# Patient Record
Sex: FEMALE | Race: WHITE | NOT HISPANIC OR LATINO | Employment: OTHER | URBAN - METROPOLITAN AREA
[De-identification: names, ages, dates, MRNs, and addresses within clinical notes are randomized per-mention and may not be internally consistent; named-entity substitution may affect disease eponyms.]

---

## 2017-04-14 ENCOUNTER — TELEPHONE (OUTPATIENT)
Dept: CARDIOLOGY | Facility: CLINIC | Age: 67
End: 2017-04-14

## 2017-04-14 NOTE — TELEPHONE ENCOUNTER
Pt left v/m inquiring about antibiotics.  Pt has not been seen in 3yrs and is considered a new pt.  I called but had to leave a v/m, informed her to contact scheduling to make a new pt appt.

## 2017-06-13 ENCOUNTER — HOSPITAL ENCOUNTER (OUTPATIENT)
Dept: CARDIOLOGY | Facility: HOSPITAL | Age: 67
Discharge: HOME | End: 2017-06-13
Admitting: INTERNAL MEDICINE

## 2017-06-13 ENCOUNTER — TRANSCRIBE ORDERS (OUTPATIENT)
Dept: ADMINISTRATIVE | Facility: HOSPITAL | Age: 67
End: 2017-06-13

## 2017-06-13 DIAGNOSIS — M79.89 PAIN AND SWELLING OF LEFT LOWER LEG: Primary | ICD-10-CM

## 2017-06-13 DIAGNOSIS — M79.89 PAIN AND SWELLING OF LEFT LOWER LEG: ICD-10-CM

## 2017-06-13 DIAGNOSIS — M79.662 PAIN AND SWELLING OF LEFT LOWER LEG: Primary | ICD-10-CM

## 2017-06-13 DIAGNOSIS — M79.662 PAIN AND SWELLING OF LEFT LOWER LEG: ICD-10-CM

## 2017-06-13 LAB
BH CV LOWER VASCULAR LEFT COMMON FEMORAL AUGMENT: NORMAL
BH CV LOWER VASCULAR LEFT COMMON FEMORAL COMPETENT: NORMAL
BH CV LOWER VASCULAR LEFT COMMON FEMORAL COMPRESS: NORMAL
BH CV LOWER VASCULAR LEFT COMMON FEMORAL PHASIC: NORMAL
BH CV LOWER VASCULAR LEFT COMMON FEMORAL SPONT: NORMAL
BH CV LOWER VASCULAR LEFT DISTAL FEMORAL COMPRESS: NORMAL
BH CV LOWER VASCULAR LEFT GASTRONEMIUS COMPRESS: NORMAL
BH CV LOWER VASCULAR LEFT GREATER SAPH AK COMPRESS: NORMAL
BH CV LOWER VASCULAR LEFT GREATER SAPH BK COMPRESS: NORMAL
BH CV LOWER VASCULAR LEFT MID FEMORAL AUGMENT: NORMAL
BH CV LOWER VASCULAR LEFT MID FEMORAL COMPETENT: NORMAL
BH CV LOWER VASCULAR LEFT MID FEMORAL COMPRESS: NORMAL
BH CV LOWER VASCULAR LEFT MID FEMORAL PHASIC: NORMAL
BH CV LOWER VASCULAR LEFT MID FEMORAL SPONT: NORMAL
BH CV LOWER VASCULAR LEFT PERONEAL COMPRESS: NORMAL
BH CV LOWER VASCULAR LEFT POPLITEAL AUGMENT: NORMAL
BH CV LOWER VASCULAR LEFT POPLITEAL COMPETENT: NORMAL
BH CV LOWER VASCULAR LEFT POPLITEAL COMPRESS: NORMAL
BH CV LOWER VASCULAR LEFT POPLITEAL PHASIC: NORMAL
BH CV LOWER VASCULAR LEFT POPLITEAL SPONT: NORMAL
BH CV LOWER VASCULAR LEFT POSTERIOR TIBIAL COMPRESS: NORMAL
BH CV LOWER VASCULAR LEFT PROXIMAL FEMORAL COMPRESS: NORMAL
BH CV LOWER VASCULAR LEFT SAPHENOFEMORAL JUNCTION AUGMENT: NORMAL
BH CV LOWER VASCULAR LEFT SAPHENOFEMORAL JUNCTION COMPETENT: NORMAL
BH CV LOWER VASCULAR LEFT SAPHENOFEMORAL JUNCTION COMPRESS: NORMAL
BH CV LOWER VASCULAR LEFT SAPHENOFEMORAL JUNCTION PHASIC: NORMAL
BH CV LOWER VASCULAR LEFT SAPHENOFEMORAL JUNCTION SPONT: NORMAL
BH CV LOWER VASCULAR RIGHT COMMON FEMORAL AUGMENT: NORMAL
BH CV LOWER VASCULAR RIGHT COMMON FEMORAL COMPETENT: NORMAL
BH CV LOWER VASCULAR RIGHT COMMON FEMORAL COMPRESS: NORMAL
BH CV LOWER VASCULAR RIGHT COMMON FEMORAL PHASIC: NORMAL
BH CV LOWER VASCULAR RIGHT COMMON FEMORAL SPONT: NORMAL

## 2017-06-13 PROCEDURE — 93971 EXTREMITY STUDY: CPT

## 2018-06-10 ENCOUNTER — PREP FOR SURGERY (OUTPATIENT)
Dept: OTHER | Facility: HOSPITAL | Age: 68
End: 2018-06-10

## 2018-06-10 DIAGNOSIS — K63.5 COLON POLYP: Primary | ICD-10-CM

## 2018-07-06 ENCOUNTER — OFFICE VISIT (OUTPATIENT)
Dept: GASTROENTEROLOGY | Facility: CLINIC | Age: 68
End: 2018-07-06

## 2018-07-06 VITALS
BODY MASS INDEX: 19.36 KG/M2 | SYSTOLIC BLOOD PRESSURE: 130 MMHG | DIASTOLIC BLOOD PRESSURE: 78 MMHG | TEMPERATURE: 97.8 F | WEIGHT: 113.4 LBS | HEIGHT: 64 IN

## 2018-07-06 DIAGNOSIS — R14.0 ABDOMINAL BLOATING: ICD-10-CM

## 2018-07-06 DIAGNOSIS — R11.0 NAUSEA: ICD-10-CM

## 2018-07-06 DIAGNOSIS — R68.81 EARLY SATIETY: ICD-10-CM

## 2018-07-06 DIAGNOSIS — R10.12 LEFT UPPER QUADRANT PAIN: Primary | ICD-10-CM

## 2018-07-06 DIAGNOSIS — Z80.0 FAMILY HISTORY OF PANCREATIC CANCER: ICD-10-CM

## 2018-07-06 DIAGNOSIS — D12.6 ADENOMATOUS POLYP OF COLON, UNSPECIFIED PART OF COLON: ICD-10-CM

## 2018-07-06 LAB
ALBUMIN SERPL-MCNC: 5.3 G/DL (ref 3.5–5.2)
ALBUMIN/GLOB SERPL: 2 G/DL
ALP SERPL-CCNC: 69 U/L (ref 39–117)
ALT SERPL-CCNC: 13 U/L (ref 1–33)
AMYLASE SERPL-CCNC: 78 U/L (ref 28–100)
AST SERPL-CCNC: 16 U/L (ref 1–32)
BASOPHILS # BLD AUTO: 0.03 10*3/MM3 (ref 0–0.2)
BASOPHILS NFR BLD AUTO: 0.6 % (ref 0–1.5)
BILIRUB SERPL-MCNC: 0.3 MG/DL (ref 0.1–1.2)
BUN SERPL-MCNC: 9 MG/DL (ref 8–23)
BUN/CREAT SERPL: 12.5 (ref 7–25)
CALCIUM SERPL-MCNC: 9.7 MG/DL (ref 8.6–10.5)
CHLORIDE SERPL-SCNC: 100 MMOL/L (ref 98–107)
CO2 SERPL-SCNC: 29.7 MMOL/L (ref 22–29)
CREAT SERPL-MCNC: 0.72 MG/DL (ref 0.57–1)
EOSINOPHIL # BLD AUTO: 0.06 10*3/MM3 (ref 0–0.7)
EOSINOPHIL NFR BLD AUTO: 1.2 % (ref 0.3–6.2)
ERYTHROCYTE [DISTWIDTH] IN BLOOD BY AUTOMATED COUNT: 12.5 % (ref 11.7–13)
GLOBULIN SER CALC-MCNC: 2.6 GM/DL
GLUCOSE SERPL-MCNC: 79 MG/DL (ref 65–99)
HCT VFR BLD AUTO: 36.8 % (ref 35.6–45.5)
HGB BLD-MCNC: 12.1 G/DL (ref 11.9–15.5)
IMM GRANULOCYTES # BLD: 0 10*3/MM3 (ref 0–0.03)
IMM GRANULOCYTES NFR BLD: 0 % (ref 0–0.5)
LIPASE SERPL-CCNC: 30 U/L (ref 13–60)
LYMPHOCYTES # BLD AUTO: 1.7 10*3/MM3 (ref 0.9–4.8)
LYMPHOCYTES NFR BLD AUTO: 34.4 % (ref 19.6–45.3)
MCH RBC QN AUTO: 30 PG (ref 26.9–32)
MCHC RBC AUTO-ENTMCNC: 32.9 G/DL (ref 32.4–36.3)
MCV RBC AUTO: 91.1 FL (ref 80.5–98.2)
MONOCYTES # BLD AUTO: 0.34 10*3/MM3 (ref 0.2–1.2)
MONOCYTES NFR BLD AUTO: 6.9 % (ref 5–12)
NEUTROPHILS # BLD AUTO: 2.81 10*3/MM3 (ref 1.9–8.1)
NEUTROPHILS NFR BLD AUTO: 56.9 % (ref 42.7–76)
PLATELET # BLD AUTO: 283 10*3/MM3 (ref 140–500)
POTASSIUM SERPL-SCNC: 4.3 MMOL/L (ref 3.5–5.2)
PROT SERPL-MCNC: 7.9 G/DL (ref 6–8.5)
RBC # BLD AUTO: 4.04 10*6/MM3 (ref 3.9–5.2)
SODIUM SERPL-SCNC: 140 MMOL/L (ref 136–145)
WBC # BLD AUTO: 4.94 10*3/MM3 (ref 4.5–10.7)

## 2018-07-06 PROCEDURE — 99214 OFFICE O/P EST MOD 30 MIN: CPT | Performed by: NURSE PRACTITIONER

## 2018-07-06 NOTE — PROGRESS NOTES
Chief Complaint   Patient presents with   • Abdominal Pain     upper left        Lillian Harding is a  68 y.o. female here for a follow up visit for abd pain, nausea and bloating.     HPI  69 yo f presents today for follow up visit for LUQ abd pain, nausea and abd bloating. She is a patient of Dr. Pedraza. She was last seen in the office on 12/2015. At that time she was having RUQ abd pain and had negative gallbladder workup. Since then the RUQ abd resolved. She did have colonoscopy done on 6/6/2016 that showed a 4 mm colon polyp. Path + TA with low grade dysplasia. Dr. Pedraza had wanted her to repeat another colonoscopy with better prep in a year but patient was lost to follow up. Earlier this year she started having early satiety, occasional nausea, abd bloating and LUQ abd pain. She went to her PCP who referred her back here. Patient admits sometimes the pain is worse with movement or a lot of yard work. It can be sharp and stabbing or dull and achey. It can sometimes feel like it wraps around to the upper back. She admits her bowels are slow and she normally has BM every other day but this has been her pattern for years. She denies any dysphagia, reflux, vomiting, diarrhea, rectal bleeding or melena. She doesn't see any correlation between the abd pain and eating or having a BM. Pain does seem worse later in the day. She admits her appetite is good and her weight is stable. She is worried because both parents had pancreatitic cancer. She denies previous pancreatitis or biliary issues.   Past Medical History:   Diagnosis Date   • Acoustic neuroma (CMS/HCC)    • Colon polyp    • Hyperlipidemia    • Hypertension    • Meniere disease    • Raynaud's phenomenon    • Rheumatic fever        Past Surgical History:   Procedure Laterality Date   • COLONOSCOPY N/A 6/6/2016    4mm colon polyp, otherwise normal exam   • ENDOMETRIAL ABLATION     • EYE SURGERY Left     vitreous detachment   • HYSTERECTOMY         Scheduled  Meds:    Continuous Infusions:  No current facility-administered medications for this visit.     PRN Meds:.    Allergies   Allergen Reactions   • Mobic [Meloxicam] Swelling       Social History     Social History   • Marital status:      Spouse name: N/A   • Number of children: N/A   • Years of education: N/A     Occupational History   • Not on file.     Social History Main Topics   • Smoking status: Never Smoker   • Smokeless tobacco: Not on file   • Alcohol use No   • Drug use: No   • Sexual activity: Not Currently     Partners: Male     Birth control/ protection: Post-menopausal     Other Topics Concern   • Not on file     Social History Narrative   • No narrative on file       Family History   Problem Relation Age of Onset   • Heart disease Father    • Heart disease Brother    • Stroke Maternal Uncle    • Stroke Maternal Grandfather    • Heart disease Paternal Grandmother        Review of Systems   Constitutional: Negative for activity change, appetite change, chills, diaphoresis, fatigue, fever and unexpected weight change.   HENT: Negative for nosebleeds, postnasal drip, sore throat, trouble swallowing and voice change.    Respiratory: Negative for cough, choking, chest tightness, shortness of breath and wheezing.    Cardiovascular: Negative for chest pain.   Gastrointestinal: Positive for abdominal distention, abdominal pain and nausea. Negative for anal bleeding, blood in stool, constipation, diarrhea, rectal pain and vomiting.   Endocrine: Negative for polydipsia, polyphagia and polyuria.   Musculoskeletal: Negative for gait problem.   Skin: Negative for rash and wound.   Allergic/Immunologic: Negative for food allergies.   Neurological: Negative for dizziness, speech difficulty and light-headedness.   Psychiatric/Behavioral: Negative for confusion, self-injury, sleep disturbance and suicidal ideas.       Vitals:    07/06/18 0919   BP: 130/78   Temp: 97.8 °F (36.6 °C)       Physical Exam    Constitutional: She is oriented to person, place, and time. She appears well-developed and well-nourished. She does not appear ill. No distress.   HENT:   Head: Normocephalic.   Eyes: Pupils are equal, round, and reactive to light.   Cardiovascular: Normal rate, regular rhythm and normal heart sounds.    Pulmonary/Chest: Effort normal and breath sounds normal.   Abdominal: Soft. Bowel sounds are normal. She exhibits no distension and no mass. There is no hepatosplenomegaly. There is no tenderness. There is no rebound and no guarding. No hernia.   Musculoskeletal: Normal range of motion.   Neurological: She is alert and oriented to person, place, and time.   Skin: Skin is warm and dry.   Psychiatric: She has a normal mood and affect. Her speech is normal and behavior is normal. Judgment normal.       No images are attached to the encounter.    Assessment & Plan     1. Left upper quadrant pain  - CBC & Differential  - Comprehensive Metabolic Panel  - Amylase  - Lipase  - Case Request; Standing  - Case Request    2. Early satiety  - CBC & Differential  - Comprehensive Metabolic Panel  - Case Request; Standing  - Case Request    3. Nausea  - Amylase  - Lipase  - Case Request; Standing  - Case Request    4. Abdominal bloating  - Amylase  - Lipase  - Case Request; Standing  - Case Request    5. Adenomatous polyp of colon, unspecified part of colon  - Case Request; Standing  - Case Request    6. Family history of pancreatic cancer  - Amylase  - Lipase      Given hx and current symptoms will check labs and CT scan abd/pelvis. Recommend EGD and colonoscopy with Dr. Pedraza for further evaluation. Discussed potential risks of the procedures and patient is agreeable to proceed. Call office with any issues. Follow up with Dr. Pedraza after scopes.

## 2018-07-11 ENCOUNTER — HOSPITAL ENCOUNTER (OUTPATIENT)
Dept: CT IMAGING | Facility: HOSPITAL | Age: 68
Discharge: HOME | End: 2018-07-11
Admitting: NURSE PRACTITIONER

## 2018-07-11 DIAGNOSIS — Z80.0 FAMILY HISTORY OF PANCREATIC CANCER: ICD-10-CM

## 2018-07-11 DIAGNOSIS — R68.81 EARLY SATIETY: ICD-10-CM

## 2018-07-11 DIAGNOSIS — R10.12 LEFT UPPER QUADRANT PAIN: ICD-10-CM

## 2018-07-11 DIAGNOSIS — R14.0 ABDOMINAL BLOATING: ICD-10-CM

## 2018-07-11 DIAGNOSIS — R11.0 NAUSEA: ICD-10-CM

## 2018-07-11 LAB — CREAT BLDA-MCNC: 0.6 MG/DL (ref 0.6–1.3)

## 2018-07-11 PROCEDURE — 74177 CT ABD & PELVIS W/CONTRAST: CPT

## 2018-07-11 PROCEDURE — 25010000002 IOPAMIDOL 61 % SOLUTION: Performed by: NURSE PRACTITIONER

## 2018-07-11 PROCEDURE — 82565 ASSAY OF CREATININE: CPT

## 2018-07-11 RX ADMIN — IOPAMIDOL 85 ML: 612 INJECTION, SOLUTION INTRAVENOUS at 14:53

## 2018-07-13 ENCOUNTER — TELEPHONE (OUTPATIENT)
Dept: GASTROENTEROLOGY | Facility: CLINIC | Age: 68
End: 2018-07-13

## 2018-07-13 NOTE — TELEPHONE ENCOUNTER
----- Message from LACIE Perry sent at 7/6/2018  4:30 PM EDT -----  Please call patient and let her know all her labs looked good including her pancreas labs. Thanks.

## 2018-07-13 NOTE — TELEPHONE ENCOUNTER
Call to pt.  Advise per KATY Sifuentes that all labs looked good, including pancrease labs.  Pt verb understanding.

## 2018-07-26 ENCOUNTER — TELEPHONE (OUTPATIENT)
Dept: GASTROENTEROLOGY | Facility: CLINIC | Age: 68
End: 2018-07-26

## 2018-07-26 NOTE — TELEPHONE ENCOUNTER
----- Message from Seamus Vasquez sent at 7/26/2018  1:38 PM EDT -----  Regarding: PT CALLED FOR HER CT SCAN RESULTS   Contact: [phone]  ...

## 2018-07-26 NOTE — TELEPHONE ENCOUNTER
----- Message from LACIE Perry sent at 7/16/2018  8:32 AM EDT -----  Please call patient and let her know the CT scan showed:      Impression    1. No acute abnormality within the abdomen and pelvis.  2. Bilateral renal cortical cysts, the largest cyst within the left  kidney demonstrates thin internal septation. Follow-up recommended.  3. Moderate to large amount of stool throughout the colon suggestive of  constipation.    Is she aware of these renal cysts? I would recommend she have her PCP follow up with her about them. Looks like she has issues with constipation. Otherwise no other acute abdominal process noted. I would recommend she start some stool softeners or eat some prunes to help her get more regular. She can also try miralax if she hasnt already. Thanks.

## 2018-07-26 NOTE — TELEPHONE ENCOUNTER
Call to pt.  [facility] has seen CT results in MyChart.    Advise per M Maria that checking if aware of renal cysts.  Would recommend f/u with PCP about them.  Looks like issues with constipation.  Otherwise, no acute abd process noted.  Recommend start some stool softeners or eat some prunes to help get more regular.  Can also try miralax if hasn't already.    Pt verb understanding.  [facility] has made appt with Dr Santiago Kc to f/u on renal cysts.    CT faxed via [facility] to DR Santiago Kc.

## 2018-08-17 ENCOUNTER — ANESTHESIA (OUTPATIENT)
Dept: GASTROENTEROLOGY | Facility: HOSPITAL | Age: 68
End: 2018-08-17

## 2018-08-17 ENCOUNTER — HOSPITAL ENCOUNTER (OUTPATIENT)
Facility: HOSPITAL | Age: 68
Setting detail: HOSPITAL OUTPATIENT SURGERY
Discharge: HOME | End: 2018-08-17
Attending: INTERNAL MEDICINE | Admitting: INTERNAL MEDICINE

## 2018-08-17 ENCOUNTER — ANESTHESIA EVENT (OUTPATIENT)
Dept: GASTROENTEROLOGY | Facility: HOSPITAL | Age: 68
End: 2018-08-17

## 2018-08-17 VITALS
HEIGHT: 64 IN | OXYGEN SATURATION: 100 % | RESPIRATION RATE: 12 BRPM | HEART RATE: 64 BPM | BODY MASS INDEX: 18.71 KG/M2 | WEIGHT: 109.6 LBS | DIASTOLIC BLOOD PRESSURE: 69 MMHG | TEMPERATURE: 98.1 F | SYSTOLIC BLOOD PRESSURE: 117 MMHG

## 2018-08-17 DIAGNOSIS — R14.0 ABDOMINAL BLOATING: ICD-10-CM

## 2018-08-17 DIAGNOSIS — R10.12 LEFT UPPER QUADRANT PAIN: ICD-10-CM

## 2018-08-17 DIAGNOSIS — R11.0 NAUSEA: ICD-10-CM

## 2018-08-17 DIAGNOSIS — R68.81 EARLY SATIETY: ICD-10-CM

## 2018-08-17 DIAGNOSIS — D12.6 ADENOMATOUS POLYP OF COLON, UNSPECIFIED PART OF COLON: ICD-10-CM

## 2018-08-17 PROCEDURE — 88312 SPECIAL STAINS GROUP 1: CPT | Performed by: INTERNAL MEDICINE

## 2018-08-17 PROCEDURE — 43239 EGD BIOPSY SINGLE/MULTIPLE: CPT | Performed by: INTERNAL MEDICINE

## 2018-08-17 PROCEDURE — 88305 TISSUE EXAM BY PATHOLOGIST: CPT | Performed by: INTERNAL MEDICINE

## 2018-08-17 PROCEDURE — 25010000002 PROPOFOL 10 MG/ML EMULSION: Performed by: ANESTHESIOLOGY

## 2018-08-17 PROCEDURE — 45380 COLONOSCOPY AND BIOPSY: CPT | Performed by: INTERNAL MEDICINE

## 2018-08-17 RX ORDER — PROPOFOL 10 MG/ML
VIAL (ML) INTRAVENOUS AS NEEDED
Status: DISCONTINUED | OUTPATIENT
Start: 2018-08-17 | End: 2018-08-17 | Stop reason: SURG

## 2018-08-17 RX ORDER — SODIUM CHLORIDE, SODIUM LACTATE, POTASSIUM CHLORIDE, CALCIUM CHLORIDE 600; 310; 30; 20 MG/100ML; MG/100ML; MG/100ML; MG/100ML
1000 INJECTION, SOLUTION INTRAVENOUS CONTINUOUS
Status: DISCONTINUED | OUTPATIENT
Start: 2018-08-17 | End: 2018-08-17 | Stop reason: HOSPADM

## 2018-08-17 RX ORDER — LIDOCAINE HYDROCHLORIDE 20 MG/ML
INJECTION, SOLUTION INFILTRATION; PERINEURAL AS NEEDED
Status: DISCONTINUED | OUTPATIENT
Start: 2018-08-17 | End: 2018-08-17 | Stop reason: SURG

## 2018-08-17 RX ORDER — UBIDECARENONE 100 MG
200 CAPSULE ORAL DAILY
COMMUNITY

## 2018-08-17 RX ORDER — ESTRADIOL 0.1 MG/G
2 CREAM VAGINAL DAILY
COMMUNITY

## 2018-08-17 RX ADMIN — SODIUM CHLORIDE, POTASSIUM CHLORIDE, SODIUM LACTATE AND CALCIUM CHLORIDE 1000 ML: 600; 310; 30; 20 INJECTION, SOLUTION INTRAVENOUS at 08:49

## 2018-08-17 RX ADMIN — PROPOFOL 200 MG: 10 INJECTION, EMULSION INTRAVENOUS at 09:01

## 2018-08-17 RX ADMIN — PROPOFOL 200 MG: 10 INJECTION, EMULSION INTRAVENOUS at 09:25

## 2018-08-17 RX ADMIN — LIDOCAINE HYDROCHLORIDE 100 MG: 20 INJECTION, SOLUTION INFILTRATION; PERINEURAL at 09:01

## 2018-08-17 NOTE — H&P
Chief Complaint   Patient presents with   • Abdominal Pain       upper left          Lillian Harding is a  68 y.o. female here for a follow up visit for abd pain, nausea and bloating.      HPI  67 yo f presents today for follow up visit for LUQ abd pain, nausea and abd bloating. She is a patient of Dr. Pedraza. She was last seen in the office on 12/2015. At that time she was having RUQ abd pain and had negative gallbladder workup. Since then the RUQ abd resolved. She did have colonoscopy done on 6/6/2016 that showed a 4 mm colon polyp. Path + TA with low grade dysplasia. Dr. Pedraza had wanted her to repeat another colonoscopy with better prep in a year but patient was lost to follow up. Earlier this year she started having early satiety, occasional nausea, abd bloating and LUQ abd pain. She went to her PCP who referred her back here. Patient admits sometimes the pain is worse with movement or a lot of yard work. It can be sharp and stabbing or dull and achey. It can sometimes feel like it wraps around to the upper back. She admits her bowels are slow and she normally has BM every other day but this has been her pattern for years. She denies any dysphagia, reflux, vomiting, diarrhea, rectal bleeding or melena. She doesn't see any correlation between the abd pain and eating or having a BM. Pain does seem worse later in the day. She admits her appetite is good and her weight is stable. She is worried because both parents had pancreatitic cancer. She denies previous pancreatitis or biliary issues.   Medical History        Past Medical History:   Diagnosis Date   • Acoustic neuroma (CMS/HCC)     • Colon polyp     • Hyperlipidemia     • Hypertension     • Meniere disease     • Raynaud's phenomenon     • Rheumatic fever              Surgical History         Past Surgical History:   Procedure Laterality Date   • COLONOSCOPY N/A 6/6/2016     4mm colon polyp, otherwise normal exam   • ENDOMETRIAL ABLATION       • EYE SURGERY  Left       vitreous detachment   • HYSTERECTOMY                Scheduled Meds:     Continuous Infusions:  No current facility-administered medications for this visit.      PRN Meds:.     Allergies   Allergen Reactions   • Mobic [Meloxicam] Swelling         Social History   Social History            Social History   • Marital status:        Spouse name: N/A   • Number of children: N/A   • Years of education: N/A          Occupational History   • Not on file.            Social History Main Topics   • Smoking status: Never Smoker   • Smokeless tobacco: Not on file   • Alcohol use No   • Drug use: No   • Sexual activity: Not Currently       Partners: Male       Birth control/ protection: Post-menopausal           Other Topics Concern   • Not on file          Social History Narrative   • No narrative on file                  Family History   Problem Relation Age of Onset   • Heart disease Father     • Heart disease Brother     • Stroke Maternal Uncle     • Stroke Maternal Grandfather     • Heart disease Paternal Grandmother           Review of Systems   Constitutional: Negative for activity change, appetite change, chills, diaphoresis, fatigue, fever and unexpected weight change.   HENT: Negative for nosebleeds, postnasal drip, sore throat, trouble swallowing and voice change.    Respiratory: Negative for cough, choking, chest tightness, shortness of breath and wheezing.    Cardiovascular: Negative for chest pain.   Gastrointestinal: Positive for abdominal distention, abdominal pain and nausea. Negative for anal bleeding, blood in stool, constipation, diarrhea, rectal pain and vomiting.   Endocrine: Negative for polydipsia, polyphagia and polyuria.   Musculoskeletal: Negative for gait problem.   Skin: Negative for rash and wound.   Allergic/Immunologic: Negative for food allergies.   Neurological: Negative for dizziness, speech difficulty and light-headedness.   Psychiatric/Behavioral: Negative for confusion,  self-injury, sleep disturbance and suicidal ideas.             Vitals:     07/06/18 0919   BP: 130/78   Temp: 97.8 °F (36.6 °C)         Physical Exam   Constitutional: She is oriented to person, place, and time. She appears well-developed and well-nourished. She does not appear ill. No distress.   HENT:   Head: Normocephalic.   Eyes: Pupils are equal, round, and reactive to light.   Cardiovascular: Normal rate, regular rhythm and normal heart sounds.    Pulmonary/Chest: Effort normal and breath sounds normal.   Abdominal: Soft. Bowel sounds are normal. She exhibits no distension and no mass. There is no hepatosplenomegaly. There is no tenderness. There is no rebound and no guarding. No hernia.   Musculoskeletal: Normal range of motion.   Neurological: She is alert and oriented to person, place, and time.   Skin: Skin is warm and dry.   Psychiatric: She has a normal mood and affect. Her speech is normal and behavior is normal. Judgment normal.         No images are attached to the encounter.     Assessment & Plan      1. Left upper quadrant pain  - CBC & Differential  - Comprehensive Metabolic Panel  - Amylase  - Lipase  - Case Request; Standing  - Case Request     2. Early satiety  - CBC & Differential  - Comprehensive Metabolic Panel  - Case Request; Standing  - Case Request     3. Nausea  - Amylase  - Lipase  - Case Request; Standing  - Case Request     4. Abdominal bloating  - Amylase  - Lipase  - Case Request; Standing  - Case Request     5. Adenomatous polyp of colon, unspecified part of colon  - Case Request; Standing  - Case Request     6. Family history of pancreatic cancer  - Amylase  - Lipase        Given hx and current symptoms will check labs and CT scan abd/pelvis. Recommend EGD and colonoscopy with Dr. Pedraza for further evaluation. Discussed potential risks of the procedures and patient is agreeable to proceed. Call office with any issues. Follow up with Dr. Pedraza after scopes.     8/17/18 - No change  from the above H and P.  Akil Pedraza M.D.

## 2018-08-17 NOTE — ANESTHESIA PREPROCEDURE EVALUATION
Anesthesia Evaluation     Patient summary reviewed and Nursing notes reviewed   NPO Solid Status: > 8 hours  NPO Liquid Status: > 2 hours           Airway   Mallampati: III  TM distance: <3 FB  Neck ROM: limited  Possible difficult intubation  Dental - normal exam     Pulmonary - normal exam   Cardiovascular - normal exam    (+) hypertension less than 2 medications,       Neuro/Psych  GI/Hepatic/Renal/Endo      Musculoskeletal     Abdominal    Substance History      OB/GYN          Other          Other Comment: maryann                 Anesthesia Plan    ASA 2     MAC     Anesthetic plan and risks discussed with patient.

## 2018-08-17 NOTE — ANESTHESIA POSTPROCEDURE EVALUATION
"Patient: Lillian Harding    Procedure Summary     Date:  08/17/18 Room / Location:  [facility] ENDOSCOPY 6 /  [facility]    Anesthesia Start:  0901 Anesthesia Stop:  0934    Procedures:       ESOPHAGOGASTRODUODENOSCOPY WITH BIOPSIES (N/A Esophagus)      COLONOSCOPY TO CECUM AND TERMINAL ILEUM WITH COLD BIOPSY POLYPECTOMY (N/A ) Diagnosis:       Early satiety      Nausea      Abdominal bloating      Left upper quadrant pain      Adenomatous polyp of colon, unspecified part of colon      (Early satiety [R68.81])      (Nausea [R11.0])      (Abdominal bloating [R14.0])      (Left upper quadrant pain [R10.12])      (Adenomatous polyp of colon, unspecified part of colon [D12.6])    Surgeon:  Akil Pedraza MD Provider:  Rochelle Smith MD    Anesthesia Type:  MAC ASA Status:  2          Anesthesia Type: No value filed.  Last vitals  BP   105/66 (08/17/18 0949)   Temp   36.7 °C (98.1 °F) (08/17/18 0935)   Pulse   72 (08/17/18 0949)   Resp   12 (08/17/18 0949)     SpO2   99 % (08/17/18 0949)     Post Anesthesia Care and Evaluation    Patient location during evaluation: bedside  Patient participation: complete - patient participated  Level of consciousness: awake and alert  Pain management: adequate  Airway patency: patent  Anesthetic complications: No anesthetic complications    Cardiovascular status: acceptable  Respiratory status: acceptable  Hydration status: acceptable    Comments: /66   Pulse 72   Temp 36.7 °C (98.1 °F) (Oral)   Resp 12   Ht 162.6 cm (64\")   Wt 49.7 kg (109 lb 9.6 oz)   SpO2 99%   BMI 18.81 kg/m²       "

## 2018-08-20 LAB
CYTO UR: NORMAL
LAB AP CASE REPORT: NORMAL
PATH REPORT.FINAL DX SPEC: NORMAL
PATH REPORT.GROSS SPEC: NORMAL

## 2018-08-30 ENCOUNTER — TELEPHONE (OUTPATIENT)
Dept: GASTROENTEROLOGY | Facility: CLINIC | Age: 68
End: 2018-08-30

## 2018-08-30 NOTE — TELEPHONE ENCOUNTER
----- Message from Akil Pedraza MD sent at 8/21/2018  5:08 PM EDT -----  Please tell her that pathology from the EGD looked okay.  The colon polyp that was removed was not cancerous and not precancerous, which is good.  Because of the poor colonic preparation during this colonoscopy I would recommend that she have a repeat colonoscopy in one year and that we use a more aggressive colonoscopy prep prior to next year's colonoscopy.  We could use, for example, a split dose GoLYTELY prep next year prior to next year's colonoscopy.. Thx. kjh

## 2018-08-30 NOTE — TELEPHONE ENCOUNTER
Called pt and advised per Dr Pedraza that the path from the egd looked ok. The colon polyp that was removed was not cancerous which is good.  Because of poor colon prep he recommends a repeat c/s in yr with a more aggressive prep . We may use golytely.  Pt verb understanding.     C/s placed in recall for 08/17/2019.

## 2019-04-19 ENCOUNTER — TRANSCRIBE ORDERS (OUTPATIENT)
Dept: ADMINISTRATIVE | Facility: HOSPITAL | Age: 69
End: 2019-04-19

## 2019-04-19 DIAGNOSIS — D33.3 ACOUSTIC NEUROMA SYNDROME (HCC): Primary | ICD-10-CM

## 2019-05-01 ENCOUNTER — HOSPITAL ENCOUNTER (OUTPATIENT)
Dept: MRI IMAGING | Facility: HOSPITAL | Age: 69
Discharge: HOME | End: 2019-05-01
Admitting: OTOLARYNGOLOGY

## 2019-05-01 DIAGNOSIS — D33.3 ACOUSTIC NEUROMA SYNDROME (HCC): ICD-10-CM

## 2019-05-01 LAB — CREAT BLDA-MCNC: 0.6 MG/DL (ref 0.6–1.3)

## 2019-05-01 PROCEDURE — A9577 INJ MULTIHANCE: HCPCS | Performed by: OTOLARYNGOLOGY

## 2019-05-01 PROCEDURE — 0 GADOBENATE DIMEGLUMINE 529 MG/ML SOLUTION: Performed by: OTOLARYNGOLOGY

## 2019-05-01 PROCEDURE — 70553 MRI BRAIN STEM W/O & W/DYE: CPT

## 2019-05-01 PROCEDURE — 82565 ASSAY OF CREATININE: CPT

## 2019-05-01 RX ADMIN — GADOBENATE DIMEGLUMINE 10 ML: 529 INJECTION, SOLUTION INTRAVENOUS at 11:36

## 2019-06-26 ENCOUNTER — TRANSCRIBE ORDERS (OUTPATIENT)
Dept: ADMINISTRATIVE | Facility: HOSPITAL | Age: 69
End: 2019-06-26

## 2019-06-26 DIAGNOSIS — Z13.820 SCREENING FOR OSTEOPOROSIS: ICD-10-CM

## 2019-06-26 DIAGNOSIS — Z12.31 VISIT FOR SCREENING MAMMOGRAM: Primary | ICD-10-CM

## 2019-08-30 ENCOUNTER — APPOINTMENT (OUTPATIENT)
Dept: BONE DENSITY | Facility: HOSPITAL | Age: 69
End: 2019-08-30

## 2019-08-30 ENCOUNTER — HOSPITAL ENCOUNTER (OUTPATIENT)
Dept: MAMMOGRAPHY | Facility: HOSPITAL | Age: 69
Discharge: HOME | End: 2019-08-30
Admitting: INTERNAL MEDICINE

## 2019-08-30 DIAGNOSIS — Z12.31 VISIT FOR SCREENING MAMMOGRAM: ICD-10-CM

## 2019-08-30 PROCEDURE — 77067 SCR MAMMO BI INCL CAD: CPT

## 2019-08-30 PROCEDURE — 77063 BREAST TOMOSYNTHESIS BI: CPT

## 2019-10-18 ENCOUNTER — HOSPITAL ENCOUNTER (OUTPATIENT)
Dept: BONE DENSITY | Facility: HOSPITAL | Age: 69
Discharge: HOME | End: 2019-10-18
Admitting: INTERNAL MEDICINE

## 2019-10-18 DIAGNOSIS — Z13.820 SCREENING FOR OSTEOPOROSIS: ICD-10-CM

## 2019-10-18 PROCEDURE — 77080 DXA BONE DENSITY AXIAL: CPT

## 2019-11-15 ENCOUNTER — TRANSCRIBE ORDERS (OUTPATIENT)
Dept: ADMINISTRATIVE | Facility: HOSPITAL | Age: 69
End: 2019-11-15

## 2019-11-15 DIAGNOSIS — M81.0 SENILE OSTEOPOROSIS: Primary | ICD-10-CM

## 2020-07-08 ENCOUNTER — TRANSCRIBE ORDERS (OUTPATIENT)
Dept: ADMINISTRATIVE | Facility: HOSPITAL | Age: 70
End: 2020-07-08

## 2020-07-08 DIAGNOSIS — Z12.31 SCREENING MAMMOGRAM FOR HIGH-RISK PATIENT: Primary | ICD-10-CM

## 2021-03-09 DIAGNOSIS — Z23 IMMUNIZATION DUE: ICD-10-CM

## 2021-12-10 ENCOUNTER — OFFICE VISIT (OUTPATIENT)
Dept: GASTROENTEROLOGY | Facility: CLINIC | Age: 71
End: 2021-12-10

## 2021-12-10 VITALS — BODY MASS INDEX: 19.26 KG/M2 | HEIGHT: 64 IN | TEMPERATURE: 96 F | WEIGHT: 112.8 LBS

## 2021-12-10 DIAGNOSIS — K21.9 GASTROESOPHAGEAL REFLUX DISEASE WITHOUT ESOPHAGITIS: ICD-10-CM

## 2021-12-10 DIAGNOSIS — R10.12 LEFT UPPER QUADRANT PAIN: Primary | ICD-10-CM

## 2021-12-10 DIAGNOSIS — K59.00 CONSTIPATION, UNSPECIFIED CONSTIPATION TYPE: ICD-10-CM

## 2021-12-10 DIAGNOSIS — D12.6 ADENOMATOUS POLYP OF COLON, UNSPECIFIED PART OF COLON: ICD-10-CM

## 2021-12-10 PROCEDURE — 99204 OFFICE O/P NEW MOD 45 MIN: CPT | Performed by: NURSE PRACTITIONER

## 2021-12-10 RX ORDER — PANTOPRAZOLE SODIUM 20 MG/1
20 TABLET, DELAYED RELEASE ORAL DAILY
Qty: 30 TABLET | Refills: 5 | Status: SHIPPED | OUTPATIENT
Start: 2021-12-10 | End: 2022-07-22

## 2021-12-10 NOTE — PROGRESS NOTES
Chief Complaint   Patient presents with   • Abdominal Pain   • Bloated   • Constipation       Lillian Harding is a 71 y.o. female who presents with abdominal pain.    HPI  71-year-old female presents today as a new patient to the clinic with worsening abdominal pain and bloating. She is a patient of Dr. Pedraza. She was last seen in the office by me back in 7/6/2018. She has been lost to us for follow-up since. Her last EGD and colonoscopy was on 8/17/2018. She does have a family history of pancreatic cancer and a personal history of adenomatous colon polyps. She tells me lately she is really been suffering with constipation and left upper abdominal pain with lots of bloating and gas. She tells me she was given Linzess at 1 point from her primary care but this was just way too much for her. It caused too much diarrhea with urgency and cramping. She is tried everything over-the-counter including MiraLAX, stool softeners and magnesium. She has been under it really seem to work very well. She would always end up taking a laxative to eventually have a bowel movement. She also has a history of GERD and admits she has not been taking anything routinely for reflux. She does have a history of GERD/gastritis in the past. She does feel like maybe her constipation is just making everything else worse including her reflux symptoms. She denies any dysphagia, nausea and vomiting, diarrhea, rectal bleeding or melena. She admits her appetite is okay and her weight is stable.  Past Medical History:   Diagnosis Date   • Acoustic neuroma (HCC)    • Adenomatous polyp of colon    • Colon polyp    • Hx of radiation therapy    • Hyperlipidemia    • Hypertension    • Meniere disease    • Raynaud's phenomenon    • Renal cyst    • Rheumatic fever    • Sciatica        Past Surgical History:   Procedure Laterality Date   • COLONOSCOPY N/A 6/6/2016    4mm colon polyp, otherwise normal exam   • COLONOSCOPY N/A 8/17/2018    Procedure: COLONOSCOPY  "TO CECUM AND TERMINAL ILEUM WITH COLD BIOPSY POLYPECTOMY;  Surgeon: Akil Pedarza MD;  Location:  [facility];  Service: Gastroenterology   • ENDOMETRIAL ABLATION     • ENDOSCOPY N/A 8/17/2018    Procedure: ESOPHAGOGASTRODUODENOSCOPY WITH BIOPSIES;  Surgeon: Akil Pedraza MD;  Location: [facility] ENDOSCOPY;  Service: Gastroenterology   • EYE SURGERY Left     vitreous detachment   • HYSTERECTOMY     • UPPER GASTROINTESTINAL ENDOSCOPY  8/17/2018         Current Outpatient Medications:   •  AMLODIPINE BESYLATE PO, Take 1 tablet/day by mouth., Disp: , Rfl:   •  cholecalciferol (VITAMIN D3) 1000 UNITS tablet, Take 1,000 Units by mouth daily., Disp: , Rfl:   •  coenzyme Q10 100 MG capsule, Take 200 mg by mouth Daily., Disp: , Rfl:   •  estradiol (ESTRACE) 0.1 MG/GM vaginal cream, Insert 2 g into the vagina Daily., Disp: , Rfl:   •  MULTIPLE VITAMIN PO, Take 1 tablet by mouth daily., Disp: , Rfl:   •  pantoprazole (PROTONIX) 20 MG EC tablet, Take 1 tablet by mouth Daily., Disp: 30 tablet, Rfl: 5    Allergies   Allergen Reactions   • Mobic [Meloxicam] Swelling     LESION IN MOUTH   • Prednisone Other (See Comments)     Blurry vision and burning sensation in both feet   • Percocet [Oxycodone-Acetaminophen] Other (See Comments)     Pt states \"it gives me wild dreams\"       Social History     Socioeconomic History   • Marital status:    Tobacco Use   • Smoking status: Never Smoker   • Smokeless tobacco: Never Used   Substance and Sexual Activity   • Alcohol use: No   • Drug use: No   • Sexual activity: Not Currently     Partners: Male     Birth control/protection: Post-menopausal       Family History   Problem Relation Age of Onset   • Heart disease Father    • Heart disease Brother    • Stroke Maternal Uncle    • Stroke Maternal Grandfather    • Heart disease Paternal Grandmother        Review of Systems   Constitutional: Negative for appetite change, chills, diaphoresis, fatigue, fever and unexpected weight change. "   HENT: Negative for nosebleeds, postnasal drip, sore throat, trouble swallowing and voice change.    Respiratory: Negative for cough, choking, chest tightness, shortness of breath, wheezing and stridor.    Cardiovascular: Negative for chest pain, palpitations and leg swelling.   Gastrointestinal: Positive for abdominal distention, abdominal pain and constipation. Negative for anal bleeding, blood in stool, diarrhea, nausea, rectal pain and vomiting.   Endocrine: Negative for polydipsia, polyphagia and polyuria.   Musculoskeletal: Negative for gait problem.   Skin: Negative for rash and wound.   Allergic/Immunologic: Negative for food allergies.   Neurological: Negative for dizziness, speech difficulty and light-headedness.   Psychiatric/Behavioral: Negative for confusion, self-injury, sleep disturbance and suicidal ideas.       Vitals:    12/10/21 0859   Temp: 96 °F (35.6 °C)       Physical Exam  Constitutional:       General: She is not in acute distress.     Appearance: She is well-developed. She is not ill-appearing.   HENT:      Head: Normocephalic.   Eyes:      Pupils: Pupils are equal, round, and reactive to light.   Cardiovascular:      Rate and Rhythm: Normal rate and regular rhythm.      Heart sounds: Normal heart sounds.   Pulmonary:      Effort: Pulmonary effort is normal.      Breath sounds: Normal breath sounds.   Abdominal:      General: Bowel sounds are normal. There is distension.      Palpations: Abdomen is soft. There is no mass.      Tenderness: There is no abdominal tenderness. There is no guarding or rebound.      Hernia: No hernia is present.   Musculoskeletal:         General: Normal range of motion.   Skin:     General: Skin is warm and dry.   Neurological:      Mental Status: She is alert and oriented to person, place, and time.   Psychiatric:         Speech: Speech normal.         Behavior: Behavior normal.         Judgment: Judgment normal.         No radiology results for the last 7  days    No notes on file    1. Left upper quadrant pain    2. Constipation, unspecified constipation type    3. Gastroesophageal reflux disease without esophagitis    4. Adenomatous polyp of colon, unspecified part of colon      Sounds like she is having a combination of things going on here. It sounds like her GERD/gastritis is only been making worse by her constipation. We need to work on both of these issues. For the GERD I want her to start Protonix 20 mg daily every morning and see how she does. I would like her to take it for at least 2 to 3 weeks before she decides if it is helpful or not. Continue GERD precautions. For her constipation again give her samples of Trulance to trial daily or every other day. Hopefully this will work much better for her and get her bowels moving well. Continue a high-fiber diet. Patient is to increase her water and exercise as tolerated. Patient to call the office next week with an update. Patient to follow-up in 6 months. Patient is agreeable to the plan.

## 2021-12-13 ENCOUNTER — TELEPHONE (OUTPATIENT)
Dept: GASTROENTEROLOGY | Facility: CLINIC | Age: 71
End: 2021-12-13

## 2021-12-13 NOTE — TELEPHONE ENCOUNTER
Unfortunately I would let her know we are out of samples right now but she can call her insurance and find out if Motegrity is even going to be covered before we send in a prescription for her.

## 2021-12-13 NOTE — TELEPHONE ENCOUNTER
----- Message from Lillian Harding sent at 12/12/2021  5:06 PM EST -----  Regarding: Trulance  Since my visit with you on Friday, I've learned that Trulance is not covered by my insurance.       My  is having an out-patient procedure this coming Friday (12/17) at [facility], are they other samples that I could pick-up at that point in time.  Fingers crossed that another drug will not only work, but be covered by [employer] as well.    Thank you - and I'm very happy for you.  Wishing you and your family all the best.  - Lillian Coleman

## 2021-12-13 NOTE — TELEPHONE ENCOUNTER
----- Message from Lillian Harding sent at 12/13/2021 12:07 PM EST -----  Regarding: Motegrity  Thank you for the call back this morning regarding Motegrity - and I'm sorry (and aggravated) to say that my coverage under [employer] does not include Motegrity either.  I'm hoping this is not a trend.    - Lillian Coleman

## 2021-12-13 NOTE — TELEPHONE ENCOUNTER
I would see if we have some samples of Motegrity for her to trial.  Since she says Linzess was too much for her and Trulance was not covered by insurance.

## 2021-12-14 ENCOUNTER — TELEPHONE (OUTPATIENT)
Dept: GASTROENTEROLOGY | Facility: CLINIC | Age: 71
End: 2021-12-14

## 2021-12-14 NOTE — TELEPHONE ENCOUNTER
----- Message from Lillian Harding sent at 12/13/2021  6:16 PM EST -----  Regarding: Linzess  I've done some research on the names of constipation drugs, and have found that Linzess is covered by my insurance.  However I don't know how you feel about Linzess.    If you think this might be a good option for me, my mail order Rx information is:  [facility] (90 day supply thru the mail) [phone],   fax [phone]  Sorry this has not been as simple, but hopefully this will do the trick, if not I'm open to other suggestions you might have.   Thank you - Lillian Coleman

## 2021-12-14 NOTE — TELEPHONE ENCOUNTER
Lets have her come by the office and get samples of Linzess 72 and 145 to trial.  Have her start with a 72 daily and see how she does.  That way we will know exactly how much she needs before I have to send in a prescription.

## 2022-01-04 ENCOUNTER — PREP FOR SURGERY (OUTPATIENT)
Dept: OTHER | Facility: HOSPITAL | Age: 72
End: 2022-01-04

## 2022-01-04 ENCOUNTER — TELEPHONE (OUTPATIENT)
Dept: GASTROENTEROLOGY | Facility: CLINIC | Age: 72
End: 2022-01-04

## 2022-01-04 DIAGNOSIS — K59.00 CONSTIPATION, UNSPECIFIED CONSTIPATION TYPE: Primary | ICD-10-CM

## 2022-01-04 DIAGNOSIS — D12.6 ADENOMATOUS POLYP OF COLON, UNSPECIFIED PART OF COLON: ICD-10-CM

## 2022-01-04 NOTE — TELEPHONE ENCOUNTER
1/4/22 - She called stating that the Linzess 72 mcg, 145 mcg did not work. She has no abdominal pain, no nausea or vomiting. I looked at her last colonoscopy in 2018 when I recommended a repeat colonsocopy in one year after using a more aggressive colonoscopy prep.       I called in a prescription for Linzess 290 mcg one po daily. I would also like to schedule her for a colonoscopy and I would like to use a more aggressive colonoscopy prep (like a split dose Go Lytely prep?).    AL - Please schedule her for a colonoscopy. I would like to use a more aggressive colonsocopy prep because her previous colonoscopy had a poor prep. [email or web address]

## 2022-01-04 NOTE — TELEPHONE ENCOUNTER
----- Message from Lillian Harding sent at 1/4/2022  2:19 PM EST -----  Regarding: Linzess  Thank you for the samples of Linzess, but I've found they really haven't worked for me.  As suggested I began with the smaller dose and worked my way up, with unsatisfactory results.    The other remaining constipation drugs I've viewed online seem to be for opioid-induced constipation, which is not me.    I don't know what else to do other than the over the counter measures I've used in the past.   Thank you for your assistance.  - Lillian Coleman

## 2022-01-06 ENCOUNTER — TELEPHONE (OUTPATIENT)
Dept: GASTROENTEROLOGY | Facility: CLINIC | Age: 72
End: 2022-01-06

## 2022-01-06 PROBLEM — K59.00 CONSTIPATION: Status: ACTIVE | Noted: 2022-01-06

## 2022-01-06 NOTE — TELEPHONE ENCOUNTER
AYLA patient via telephone for colonoscopy. Scheduled on 03/31/2022 arriving at 1:30pm. Prep packet mailed to patients verified address. Pt also advised that  his/her arrival time may vary based on [facility] guidelines. AYLA Traylor--Yady

## 2022-01-06 NOTE — TELEPHONE ENCOUNTER
AYLA patient via telephone for colonoscopy. Scheduled on 03/31/2022 arriving at 1:30pm. Prep packet mailed to patients verified address. Pt also advised that  his/her arrival time may vary based on [facility] guidelines. AYLA Traylor--Yady

## 2022-01-12 ENCOUNTER — TELEPHONE (OUTPATIENT)
Dept: GASTROENTEROLOGY | Facility: CLINIC | Age: 72
End: 2022-01-12

## 2022-01-12 NOTE — TELEPHONE ENCOUNTER
Good Morning,    Patient called this morning looking to reschedule her procedure with Dr. Pedraza in March. Please call patient to reschedule.

## 2022-01-12 NOTE — TELEPHONE ENCOUNTER
AYLA patient via telephone for colonoscopy. Rescheduled 04/05/2022 arriving at 9:00am. Prep packet mailed to patients verified address. Pt also advised that  his/her arrival time may vary based on [facility] guidelines. AYLA Swenson--Yady

## 2022-04-05 ENCOUNTER — ANESTHESIA (OUTPATIENT)
Dept: GASTROENTEROLOGY | Facility: HOSPITAL | Age: 72
End: 2022-04-05

## 2022-04-05 ENCOUNTER — HOSPITAL ENCOUNTER (OUTPATIENT)
Facility: HOSPITAL | Age: 72
Setting detail: HOSPITAL OUTPATIENT SURGERY
Discharge: HOME | End: 2022-04-05
Attending: INTERNAL MEDICINE | Admitting: INTERNAL MEDICINE

## 2022-04-05 ENCOUNTER — ANESTHESIA EVENT (OUTPATIENT)
Dept: GASTROENTEROLOGY | Facility: HOSPITAL | Age: 72
End: 2022-04-05

## 2022-04-05 VITALS
SYSTOLIC BLOOD PRESSURE: 128 MMHG | TEMPERATURE: 98.1 F | BODY MASS INDEX: 19.46 KG/M2 | DIASTOLIC BLOOD PRESSURE: 92 MMHG | RESPIRATION RATE: 16 BRPM | HEART RATE: 68 BPM | OXYGEN SATURATION: 100 % | WEIGHT: 114 LBS | HEIGHT: 64 IN

## 2022-04-05 DIAGNOSIS — D12.6 ADENOMATOUS POLYP OF COLON, UNSPECIFIED PART OF COLON: ICD-10-CM

## 2022-04-05 DIAGNOSIS — K59.00 CONSTIPATION, UNSPECIFIED CONSTIPATION TYPE: ICD-10-CM

## 2022-04-05 LAB
ALBUMIN SERPL-MCNC: 4.2 G/DL (ref 3.5–5.2)
ALBUMIN/GLOB SERPL: 1.7 G/DL
ALP SERPL-CCNC: 84 U/L (ref 39–117)
ALT SERPL W P-5'-P-CCNC: 16 U/L (ref 1–33)
ANION GAP SERPL CALCULATED.3IONS-SCNC: 11 MMOL/L (ref 5–15)
AST SERPL-CCNC: 17 U/L (ref 1–32)
BASOPHILS # BLD AUTO: 0.05 10*3/MM3 (ref 0–0.2)
BASOPHILS NFR BLD AUTO: 1.2 % (ref 0–1.5)
BILIRUB SERPL-MCNC: 0.2 MG/DL (ref 0–1.2)
BUN SERPL-MCNC: 6 MG/DL (ref 8–23)
BUN/CREAT SERPL: 10.7 (ref 7–25)
CALCIUM SPEC-SCNC: 8.6 MG/DL (ref 8.6–10.5)
CHLORIDE SERPL-SCNC: 105 MMOL/L (ref 98–107)
CO2 SERPL-SCNC: 25 MMOL/L (ref 22–29)
CREAT SERPL-MCNC: 0.56 MG/DL (ref 0.57–1)
DEPRECATED RDW RBC AUTO: 41.6 FL (ref 37–54)
EGFRCR SERPLBLD CKD-EPI 2021: 97.1 ML/MIN/1.73
EOSINOPHIL # BLD AUTO: 0.05 10*3/MM3 (ref 0–0.4)
EOSINOPHIL NFR BLD AUTO: 1.2 % (ref 0.3–6.2)
ERYTHROCYTE [DISTWIDTH] IN BLOOD BY AUTOMATED COUNT: 12.9 % (ref 12.3–15.4)
GLOBULIN UR ELPH-MCNC: 2.5 GM/DL
GLUCOSE SERPL-MCNC: 80 MG/DL (ref 65–99)
HCT VFR BLD AUTO: 34.8 % (ref 34–46.6)
HGB BLD-MCNC: 11.5 G/DL (ref 12–15.9)
IMM GRANULOCYTES # BLD AUTO: 0.01 10*3/MM3 (ref 0–0.05)
IMM GRANULOCYTES NFR BLD AUTO: 0.2 % (ref 0–0.5)
LYMPHOCYTES # BLD AUTO: 1.14 10*3/MM3 (ref 0.7–3.1)
LYMPHOCYTES NFR BLD AUTO: 26.6 % (ref 19.6–45.3)
MCH RBC QN AUTO: 29.3 PG (ref 26.6–33)
MCHC RBC AUTO-ENTMCNC: 33 G/DL (ref 31.5–35.7)
MCV RBC AUTO: 88.8 FL (ref 79–97)
MONOCYTES # BLD AUTO: 0.41 10*3/MM3 (ref 0.1–0.9)
MONOCYTES NFR BLD AUTO: 9.6 % (ref 5–12)
NEUTROPHILS NFR BLD AUTO: 2.62 10*3/MM3 (ref 1.7–7)
NEUTROPHILS NFR BLD AUTO: 61.2 % (ref 42.7–76)
NRBC BLD AUTO-RTO: 0 /100 WBC (ref 0–0.2)
PLATELET # BLD AUTO: 254 10*3/MM3 (ref 140–450)
PMV BLD AUTO: 10.9 FL (ref 6–12)
POTASSIUM SERPL-SCNC: 4 MMOL/L (ref 3.5–5.2)
PROT SERPL-MCNC: 6.7 G/DL (ref 6–8.5)
RBC # BLD AUTO: 3.92 10*6/MM3 (ref 3.77–5.28)
SODIUM SERPL-SCNC: 141 MMOL/L (ref 136–145)
TSH SERPL DL<=0.05 MIU/L-ACNC: 1.23 UIU/ML (ref 0.27–4.2)
WBC NRBC COR # BLD: 4.28 10*3/MM3 (ref 3.4–10.8)

## 2022-04-05 PROCEDURE — 88305 TISSUE EXAM BY PATHOLOGIST: CPT | Performed by: INTERNAL MEDICINE

## 2022-04-05 PROCEDURE — 80053 COMPREHEN METABOLIC PANEL: CPT | Performed by: INTERNAL MEDICINE

## 2022-04-05 PROCEDURE — 25010000002 PHENYLEPHRINE 10 MG/ML SOLUTION: Performed by: NURSE ANESTHETIST, CERTIFIED REGISTERED

## 2022-04-05 PROCEDURE — 84443 ASSAY THYROID STIM HORMONE: CPT | Performed by: INTERNAL MEDICINE

## 2022-04-05 PROCEDURE — 45380 COLONOSCOPY AND BIOPSY: CPT | Performed by: INTERNAL MEDICINE

## 2022-04-05 PROCEDURE — 25010000002 PROPOFOL 10 MG/ML EMULSION: Performed by: NURSE ANESTHETIST, CERTIFIED REGISTERED

## 2022-04-05 PROCEDURE — 85025 COMPLETE CBC W/AUTO DIFF WBC: CPT | Performed by: INTERNAL MEDICINE

## 2022-04-05 RX ORDER — PROPOFOL 10 MG/ML
VIAL (ML) INTRAVENOUS CONTINUOUS PRN
Status: DISCONTINUED | OUTPATIENT
Start: 2022-04-05 | End: 2022-04-05 | Stop reason: SURG

## 2022-04-05 RX ORDER — SODIUM CHLORIDE, SODIUM LACTATE, POTASSIUM CHLORIDE, CALCIUM CHLORIDE 600; 310; 30; 20 MG/100ML; MG/100ML; MG/100ML; MG/100ML
30 INJECTION, SOLUTION INTRAVENOUS CONTINUOUS PRN
Status: DISCONTINUED | OUTPATIENT
Start: 2022-04-05 | End: 2022-04-05 | Stop reason: HOSPADM

## 2022-04-05 RX ORDER — PROPOFOL 10 MG/ML
VIAL (ML) INTRAVENOUS AS NEEDED
Status: DISCONTINUED | OUTPATIENT
Start: 2022-04-05 | End: 2022-04-05 | Stop reason: SURG

## 2022-04-05 RX ORDER — PHENYLEPHRINE HYDROCHLORIDE 10 MG/ML
INJECTION INTRAVENOUS AS NEEDED
Status: DISCONTINUED | OUTPATIENT
Start: 2022-04-05 | End: 2022-04-05 | Stop reason: SURG

## 2022-04-05 RX ORDER — LIDOCAINE HYDROCHLORIDE 20 MG/ML
INJECTION, SOLUTION INFILTRATION; PERINEURAL AS NEEDED
Status: DISCONTINUED | OUTPATIENT
Start: 2022-04-05 | End: 2022-04-05 | Stop reason: SURG

## 2022-04-05 RX ORDER — SODIUM CHLORIDE 0.9 % (FLUSH) 0.9 %
10 SYRINGE (ML) INJECTION AS NEEDED
Status: DISCONTINUED | OUTPATIENT
Start: 2022-04-05 | End: 2022-04-05 | Stop reason: HOSPADM

## 2022-04-05 RX ADMIN — LIDOCAINE HYDROCHLORIDE 60 MG: 20 INJECTION, SOLUTION INFILTRATION; PERINEURAL at 10:14

## 2022-04-05 RX ADMIN — SODIUM CHLORIDE, POTASSIUM CHLORIDE, SODIUM LACTATE AND CALCIUM CHLORIDE 30 ML/HR: 600; 310; 30; 20 INJECTION, SOLUTION INTRAVENOUS at 09:38

## 2022-04-05 RX ADMIN — Medication 300 MCG/KG/MIN: at 10:14

## 2022-04-05 RX ADMIN — PROPOFOL 100 MG: 10 INJECTION, EMULSION INTRAVENOUS at 10:14

## 2022-04-05 RX ADMIN — PHENYLEPHRINE HYDROCHLORIDE 100 MCG: 10 INJECTION, SOLUTION INTRAVENOUS at 10:30

## 2022-04-05 RX ADMIN — PHENYLEPHRINE HYDROCHLORIDE 200 MCG: 10 INJECTION, SOLUTION INTRAVENOUS at 10:26

## 2022-04-05 RX ADMIN — PHENYLEPHRINE HYDROCHLORIDE 200 MCG: 10 INJECTION, SOLUTION INTRAVENOUS at 10:40

## 2022-04-05 NOTE — DISCHARGE INSTRUCTIONS
For the next 24 hours patient needs to be with a responsible adult.    For 24 hours DO NOT drive, operate machinery, appliances, drink alcohol, make important decisions or sign legal documents.    Start with a light or bland diet if you are feeling sick to your stomach otherwise advance to regular diet as tolerated.    Follow recommendations on procedure report if provided by your doctor.    Call Dr Pedraza for problems [phone]    Problems may include but not limited to: large amounts of bleeding, trouble breathing, repeated vomiting, severe unrelieved pain, fever or chills.

## 2022-04-05 NOTE — H&P
Lillian Harding is a 71 y.o. female who presents with abdominal pain.     HPI  71-year-old female presents today as a new patient to the clinic with worsening abdominal pain and bloating. She is a patient of Dr. Pedraza. She was last seen in the office by me back in 7/6/2018. She has been lost to us for follow-up since. Her last EGD and colonoscopy was on 8/17/2018. She does have a family history of pancreatic cancer and a personal history of adenomatous colon polyps. She tells me lately she is really been suffering with constipation and left upper abdominal pain with lots of bloating and gas. She tells me she was given Linzess at 1 point from her primary care but this was just way too much for her. It caused too much diarrhea with urgency and cramping. She is tried everything over-the-counter including MiraLAX, stool softeners and magnesium. She has been under it really seem to work very well. She would always end up taking a laxative to eventually have a bowel movement. She also has a history of GERD and admits she has not been taking anything routinely for reflux. She does have a history of GERD/gastritis in the past. She does feel like maybe her constipation is just making everything else worse including her reflux symptoms. She denies any dysphagia, nausea and vomiting, diarrhea, rectal bleeding or melena. She admits her appetite is okay and her weight is stable.  Medical History   Past Medical History:   Diagnosis Date   • Acoustic neuroma (HCC)     • Adenomatous polyp of colon     • Colon polyp     • Hx of radiation therapy     • Hyperlipidemia     • Hypertension     • Meniere disease     • Raynaud's phenomenon     • Renal cyst     • Rheumatic fever     • Sciatica              Surgical History         Past Surgical History:   Procedure Laterality Date   • COLONOSCOPY N/A 6/6/2016     4mm colon polyp, otherwise normal exam   • COLONOSCOPY N/A 8/17/2018     Procedure: COLONOSCOPY TO CECUM AND TERMINAL ILEUM WITH  "COLD BIOPSY POLYPECTOMY;  Surgeon: Akil Pedraza MD;  Location: [facility];  Service: Gastroenterology   • ENDOMETRIAL ABLATION       • ENDOSCOPY N/A 8/17/2018     Procedure: ESOPHAGOGASTRODUODENOSCOPY WITH BIOPSIES;  Surgeon: Akil Pedraza MD;  Location: [facility];  Service: Gastroenterology   • EYE SURGERY Left       vitreous detachment   • HYSTERECTOMY       • UPPER GASTROINTESTINAL ENDOSCOPY   8/17/2018               Current Outpatient Medications:   •  AMLODIPINE BESYLATE PO, Take 1 tablet/day by mouth., Disp: , Rfl:   •  cholecalciferol (VITAMIN D3) 1000 UNITS tablet, Take 1,000 Units by mouth daily., Disp: , Rfl:   •  coenzyme Q10 100 MG capsule, Take 200 mg by mouth Daily., Disp: , Rfl:   •  estradiol (ESTRACE) 0.1 MG/GM vaginal cream, Insert 2 g into the vagina Daily., Disp: , Rfl:   •  MULTIPLE VITAMIN PO, Take 1 tablet by mouth daily., Disp: , Rfl:   •  pantoprazole (PROTONIX) 20 MG EC tablet, Take 1 tablet by mouth Daily., Disp: 30 tablet, Rfl: 5           Allergies   Allergen Reactions   • Mobic [Meloxicam] Swelling       LESION IN MOUTH   • Prednisone Other (See Comments)       Blurry vision and burning sensation in both feet   • Percocet [Oxycodone-Acetaminophen] Other (See Comments)       Pt states \"it gives me wild dreams\"         Social History   Social History            Socioeconomic History   • Marital status:    Tobacco Use   • Smoking status: Never Smoker   • Smokeless tobacco: Never Used   Substance and Sexual Activity   • Alcohol use: No   • Drug use: No   • Sexual activity: Not Currently       Partners: Male       Birth control/protection: Post-menopausal                  Family History   Problem Relation Age of Onset   • Heart disease Father     • Heart disease Brother     • Stroke Maternal Uncle     • Stroke Maternal Grandfather     • Heart disease Paternal Grandmother           Review of Systems   Constitutional: Negative for appetite change, chills, diaphoresis, " fatigue, fever and unexpected weight change.   HENT: Negative for nosebleeds, postnasal drip, sore throat, trouble swallowing and voice change.    Respiratory: Negative for cough, choking, chest tightness, shortness of breath, wheezing and stridor.    Cardiovascular: Negative for chest pain, palpitations and leg swelling.   Gastrointestinal: Positive for abdominal distention, abdominal pain and constipation. Negative for anal bleeding, blood in stool, diarrhea, nausea, rectal pain and vomiting.   Endocrine: Negative for polydipsia, polyphagia and polyuria.   Musculoskeletal: Negative for gait problem.   Skin: Negative for rash and wound.   Allergic/Immunologic: Negative for food allergies.   Neurological: Negative for dizziness, speech difficulty and light-headedness.   Psychiatric/Behavioral: Negative for confusion, self-injury, sleep disturbance and suicidal ideas.             Vitals:     12/10/21 0859   Temp: 96 °F (35.6 °C)         Physical Exam  Constitutional:       General: She is not in acute distress.     Appearance: She is well-developed. She is not ill-appearing.   HENT:      Head: Normocephalic.   Eyes:      Pupils: Pupils are equal, round, and reactive to light.   Cardiovascular:      Rate and Rhythm: Normal rate and regular rhythm.      Heart sounds: Normal heart sounds.   Pulmonary:      Effort: Pulmonary effort is normal.      Breath sounds: Normal breath sounds.   Abdominal:      General: Bowel sounds are normal. There is distension.      Palpations: Abdomen is soft. There is no mass.      Tenderness: There is no abdominal tenderness. There is no guarding or rebound.      Hernia: No hernia is present.   Musculoskeletal:         General: Normal range of motion.   Skin:     General: Skin is warm and dry.   Neurological:      Mental Status: She is alert and oriented to person, place, and time.   Psychiatric:         Speech: Speech normal.         Behavior: Behavior normal.         Judgment: Judgment  normal.            No radiology results for the last 7 days     No notes on file     1. Left upper quadrant pain     2. Constipation, unspecified constipation type     3. Gastroesophageal reflux disease without esophagitis     4. Adenomatous polyp of colon, unspecified part of colon        Sounds like she is having a combination of things going on here. It sounds like her GERD/gastritis is only been making worse by her constipation. We need to work on both of these issues. For the GERD I want her to start Protonix 20 mg daily every morning and see how she does. I would like her to take it for at least 2 to 3 weeks before she decides if it is helpful or not. Continue GERD precautions. For her constipation again give her samples of Trulance to trial daily or every other day. Hopefully this will work much better for her and get her bowels moving well. Continue a high-fiber diet. Patient is to increase her water and exercise as tolerated. Patient to call the office next week with an update. Patient to follow-up in 6 months. Patient is agreeable to the plan.    4/5/22 - No change from the above H and P.   Akil Pedraza M.D.

## 2022-04-05 NOTE — ANESTHESIA PREPROCEDURE EVALUATION
Anesthesia Evaluation     Patient summary reviewed and Nursing notes reviewed                Airway   Mallampati: II  TM distance: >3 FB  Neck ROM: full  No difficulty expected  Dental - normal exam     Pulmonary - normal exam   Cardiovascular - normal exam    (+) hypertension less than 2 medications, hyperlipidemia,       Neuro/Psych  (+) numbness,      ROS Comment: Acoustic neuroma/Meniere's dz  GI/Hepatic/Renal/Endo    (+)   renal disease,     Musculoskeletal     Abdominal  - normal exam   Substance History      OB/GYN          Other                        Anesthesia Plan    ASA 2     MAC     intravenous induction     Anesthetic plan, all risks, benefits, and alternatives have been provided, discussed and informed consent has been obtained with: patient.    Plan discussed with CRNA.        CODE STATUS:

## 2022-04-05 NOTE — ANESTHESIA POSTPROCEDURE EVALUATION
Patient: Lillian Harding    Procedure Summary     Date: 04/05/22 Room / Location:  [facility]    Anesthesia Start: 1008 Anesthesia Stop: 1044    Procedure: COLONOSCOPY into cecum/terminal ileum with polypectomy (N/A ) Diagnosis:       Constipation, unspecified constipation type      Adenomatous polyp of colon, unspecified part of colon      (Constipation, unspecified constipation type [K59.00])      (Adenomatous polyp of colon, unspecified part of colon [D12.6])    Surgeons: Akil Pedraza MD Provider: Brendan Sams MD    Anesthesia Type: MAC ASA Status: 2          Anesthesia Type: MAC    Vitals  Vitals Value Taken Time   /67 04/05/22 1043   Temp     Pulse 61 04/05/22 1036   Resp 14 04/05/22 1036   SpO2 100 % 04/05/22 1036           Post Anesthesia Care and Evaluation    Patient location during evaluation: PHASE II  Patient participation: complete - patient participated  Level of consciousness: awake and alert  Pain management: adequate  Airway patency: patent  Anesthetic complications: No anesthetic complications  PONV Status: none  Cardiovascular status: acceptable and hemodynamically stable  Respiratory status: acceptable, nonlabored ventilation and spontaneous ventilation  Hydration status: acceptable

## 2022-04-06 LAB
LAB AP CASE REPORT: NORMAL
LAB AP CLINICAL INFORMATION: NORMAL
PATH REPORT.FINAL DX SPEC: NORMAL
PATH REPORT.GROSS SPEC: NORMAL

## 2022-04-25 ENCOUNTER — TELEPHONE (OUTPATIENT)
Dept: GASTROENTEROLOGY | Facility: CLINIC | Age: 72
End: 2022-04-25

## 2022-04-25 DIAGNOSIS — D50.8 OTHER IRON DEFICIENCY ANEMIA: Primary | ICD-10-CM

## 2022-04-25 NOTE — TELEPHONE ENCOUNTER
----- Message from Akil Pedraza MD sent at 4/25/2022  7:02 AM EDT -----  04/25/22       Tell her that her lab work showed a normal TSH and comprehensive metabolic profile.  Her CBC showed that her hemoglobin was mildly low at 11.5.  I would recommend that we get the following blood tests done to evaluate her mild anemia:  - CBC, ferritin, Tibc, B12, RBC folate.       Tell her that colon polyps that were removed during her recent colonoscopy were not cancerous but were precancerous.  I recommend a repeat colonoscopy in 5 years.       please send a copy of this report to her PCP.  Sanjiv miles

## 2022-04-25 NOTE — PROGRESS NOTES
04/25/22       Tell her that her lab work showed a normal TSH and comprehensive metabolic profile.  Her CBC showed that her hemoglobin was mildly low at 11.5.  I would recommend that we get the following blood tests done to evaluate her mild anemia:  - CBC, ferritin, Tibc, B12, RBC folate.       Tell her that colon polyps that were removed during her recent colonoscopy were not cancerous but were precancerous.  I recommend a repeat colonoscopy in 5 years.       please send a copy of this report to her PCP.  Sanjiv miles

## 2022-04-25 NOTE — TELEPHONE ENCOUNTER
Patient notified of results and recommendations and verbalized understanding      Results note forwarded to PCP    Hm and cs recall placed for 4/5/27    Orders placed for labs, and apt scheduled

## 2022-04-27 ENCOUNTER — LAB (OUTPATIENT)
Dept: GASTROENTEROLOGY | Facility: CLINIC | Age: 72
End: 2022-04-27

## 2022-04-28 LAB
BASOPHILS # BLD AUTO: 0.1 X10E3/UL (ref 0–0.2)
BASOPHILS NFR BLD AUTO: 1 %
EOSINOPHIL # BLD AUTO: 0.1 X10E3/UL (ref 0–0.4)
EOSINOPHIL NFR BLD AUTO: 1 %
ERYTHROCYTE [DISTWIDTH] IN BLOOD BY AUTOMATED COUNT: 12.4 % (ref 11.7–15.4)
FERRITIN SERPL-MCNC: 56 NG/ML (ref 15–150)
FOLATE BLD-MCNC: 441 NG/ML
FOLATE RBC-MCNC: 1189 NG/ML
HCT VFR BLD AUTO: 37.1 % (ref 34–46.6)
HGB BLD-MCNC: 12.4 G/DL (ref 11.1–15.9)
IMM GRANULOCYTES # BLD AUTO: 0 X10E3/UL (ref 0–0.1)
IMM GRANULOCYTES NFR BLD AUTO: 0 %
IRON SATN MFR SERPL: 30 % (ref 15–55)
IRON SERPL-MCNC: 102 UG/DL (ref 27–139)
LYMPHOCYTES # BLD AUTO: 1.5 X10E3/UL (ref 0.7–3.1)
LYMPHOCYTES NFR BLD AUTO: 27 %
MCH RBC QN AUTO: 29.6 PG (ref 26.6–33)
MCHC RBC AUTO-ENTMCNC: 33.4 G/DL (ref 31.5–35.7)
MCV RBC AUTO: 89 FL (ref 79–97)
MONOCYTES # BLD AUTO: 0.4 X10E3/UL (ref 0.1–0.9)
MONOCYTES NFR BLD AUTO: 7 %
NEUTROPHILS # BLD AUTO: 3.5 X10E3/UL (ref 1.4–7)
NEUTROPHILS NFR BLD AUTO: 64 %
PLATELET # BLD AUTO: 290 X10E3/UL (ref 150–450)
RBC # BLD AUTO: 4.19 X10E6/UL (ref 3.77–5.28)
TIBC SERPL-MCNC: 344 UG/DL (ref 250–450)
UIBC SERPL-MCNC: 242 UG/DL (ref 118–369)
VIT B12 SERPL-MCNC: 955 PG/ML (ref 232–1245)
WBC # BLD AUTO: 5.5 X10E3/UL (ref 3.4–10.8)

## 2022-04-29 ENCOUNTER — TELEPHONE (OUTPATIENT)
Dept: GASTROENTEROLOGY | Facility: CLINIC | Age: 72
End: 2022-04-29

## 2022-04-29 NOTE — TELEPHONE ENCOUNTER
----- Message from Akil Pedraza MD sent at 4/28/2022  9:00 PM EDT -----  04/28/22       Tell her that her repeat labs looked normal. Her repeat CBC showed a normal Hgb. The other studies looked normal. Send a copy of these labs to her PCP.   Sanjiv miles

## 2022-04-29 NOTE — PROGRESS NOTES
04/28/22       Tell her that her repeat labs looked normal. Her repeat CBC showed a normal Hgb. The other studies looked normal. Send a copy of these labs to her PCP.   Sanjiv miles

## 2022-06-20 ENCOUNTER — OFFICE VISIT (OUTPATIENT)
Dept: GASTROENTEROLOGY | Facility: CLINIC | Age: 72
End: 2022-06-20

## 2022-06-20 VITALS
SYSTOLIC BLOOD PRESSURE: 128 MMHG | BODY MASS INDEX: 19.29 KG/M2 | HEART RATE: 71 BPM | HEIGHT: 64 IN | TEMPERATURE: 96.6 F | WEIGHT: 113 LBS | DIASTOLIC BLOOD PRESSURE: 75 MMHG

## 2022-06-20 DIAGNOSIS — K21.9 GASTROESOPHAGEAL REFLUX DISEASE WITHOUT ESOPHAGITIS: ICD-10-CM

## 2022-06-20 DIAGNOSIS — K59.00 CONSTIPATION, UNSPECIFIED CONSTIPATION TYPE: Primary | ICD-10-CM

## 2022-06-20 DIAGNOSIS — D12.6 ADENOMATOUS POLYP OF COLON, UNSPECIFIED PART OF COLON: ICD-10-CM

## 2022-06-20 PROCEDURE — 99214 OFFICE O/P EST MOD 30 MIN: CPT | Performed by: INTERNAL MEDICINE

## 2022-06-20 RX ORDER — AMLODIPINE BESYLATE 5 MG/1
1 TABLET ORAL DAILY
COMMUNITY
Start: 2022-06-07

## 2022-06-20 NOTE — PROGRESS NOTES
Chief Complaint   Patient presents with   • Heartburn       History of Present Illness:   72 y.o. female with a personal history of colonic polyps. She is also extremely constipated but this is better  with Linzess 290 mg/day.  She last had a colonoscopy in 4 of 2022.  I recommended a repeat colonoscopy in 5 years.       Her bowels are working much better. She is on Linzess 290 mcg po every 3rd day. NO abdominal or chest pain. NO diarrhea or rectal bleeding. No nausea or vomiting. NO dysphagia. Denies NSAIDs. No ETOH. Nonsmoker.     Past Medical History:   Diagnosis Date   • Acoustic neuroma (HCC)    • Adenomatous polyp of colon    • Colon polyp    • Hx of radiation therapy    • Hyperlipidemia    • Hypertension    • Meniere disease    • Raynaud's phenomenon    • Renal cyst    • Rheumatic fever    • Sciatica        Past Surgical History:   Procedure Laterality Date   • COLONOSCOPY N/A 6/6/2016    4mm colon polyp, otherwise normal exam   • COLONOSCOPY N/A 8/17/2018    Procedure: COLONOSCOPY TO CECUM AND TERMINAL ILEUM WITH COLD BIOPSY POLYPECTOMY;  Surgeon: Akil Pedraza MD;  Location: [facility];  Service: Gastroenterology   • COLONOSCOPY N/A 4/5/2022    Procedure: COLONOSCOPY into cecum/terminal ileum with polypectomy;  Surgeon: Akil Pedraza MD;  Location: [facility];  Service: Gastroenterology;  Laterality: N/A;  polyps, hemorrhoids   • ENDOMETRIAL ABLATION     • ENDOSCOPY N/A 8/17/2018    Procedure: ESOPHAGOGASTRODUODENOSCOPY WITH BIOPSIES;  Surgeon: Akil Pedraza MD;  Location: [facility];  Service: Gastroenterology   • EYE SURGERY Left     vitreous detachment   • HYSTERECTOMY     • UPPER GASTROINTESTINAL ENDOSCOPY  8/17/2018         Current Outpatient Medications:   •  amLODIPine (NORVASC) 5 MG tablet, Take 1 tablet by mouth Daily., Disp: , Rfl:   •  cholecalciferol (VITAMIN D3) 1000 UNITS tablet, Take 1,000 Units by mouth daily., Disp: , Rfl:   •  coenzyme Q10 100 MG capsule, Take 200 mg by  mouth Daily., Disp: , Rfl:   •  estradiol (ESTRACE) 0.1 MG/GM vaginal cream, Insert 2 g into the vagina Daily., Disp: , Rfl:   •  linaclotide (Linzess) 290 MCG capsule capsule, Take 1 capsule by mouth Every Morning Before Breakfast., Disp: 30 capsule, Rfl: 11  •  MULTIPLE VITAMIN PO, Take 1 tablet by mouth daily., Disp: , Rfl:   •  pantoprazole (PROTONIX) 20 MG EC tablet, Take 1 tablet by mouth Daily., Disp: 30 tablet, Rfl: 5    Allergies   Allergen Reactions   • Mobic [Meloxicam] Other (See Comments)     Lesions in mouth and throat       Family History   Problem Relation Age of Onset   • Heart disease Father    • Heart disease Brother    • Stroke Maternal Uncle    • Stroke Maternal Grandfather    • Heart disease Paternal Grandmother    • Malig Hyperthermia Neg Hx        Social History     Socioeconomic History   • Marital status:    Tobacco Use   • Smoking status: Never Smoker   • Smokeless tobacco: Never Used   Substance and Sexual Activity   • Alcohol use: No   • Drug use: No   • Sexual activity: Not Currently     Partners: Male     Birth control/protection: Post-menopausal       Review of Systems   Gastrointestinal: Negative for abdominal pain.   All other systems reviewed and are negative.    Pertinent positives and negatives documented in the HPI and all other systems reviewed and were found to be negative.  Vitals:    06/20/22 1058   BP: 128/75   Pulse: 71   Temp: 96.6 °F (35.9 °C)       Physical Exam  Vitals reviewed.   Constitutional:       General: She is not in acute distress.     Appearance: Normal appearance. She is well-developed. She is not diaphoretic.   HENT:      Head: Normocephalic and atraumatic. Hair is normal.      Right Ear: Hearing, tympanic membrane, ear canal and external ear normal. No decreased hearing noted. No drainage.      Left Ear: Hearing, tympanic membrane, ear canal and external ear normal. No decreased hearing noted.      Nose: Nose normal. No nasal deformity.       Mouth/Throat:      Mouth: Mucous membranes are moist.   Eyes:      General: Lids are normal.         Right eye: No discharge.         Left eye: No discharge.      Extraocular Movements: Extraocular movements intact.      Conjunctiva/sclera: Conjunctivae normal.      Pupils: Pupils are equal, round, and reactive to light.   Neck:      Thyroid: No thyromegaly.      Vascular: No JVD.      Trachea: No tracheal deviation.   Cardiovascular:      Rate and Rhythm: Normal rate and regular rhythm.      Pulses: Normal pulses.      Heart sounds: Normal heart sounds. No murmur heard.    No friction rub. No gallop.   Pulmonary:      Effort: Pulmonary effort is normal. No respiratory distress.      Breath sounds: Normal breath sounds. No wheezing or rales.   Chest:      Chest wall: No tenderness.   Abdominal:      General: Bowel sounds are normal. There is no distension.      Palpations: Abdomen is soft. There is no mass.      Tenderness: There is no abdominal tenderness. There is no guarding or rebound.      Hernia: No hernia is present.   Musculoskeletal:         General: No tenderness or deformity. Normal range of motion.      Cervical back: Normal range of motion and neck supple.   Lymphadenopathy:      Cervical: No cervical adenopathy.   Skin:     General: Skin is warm and dry.      Findings: No erythema or rash.   Neurological:      Mental Status: She is alert and oriented to person, place, and time.      Cranial Nerves: No cranial nerve deficit.      Motor: No abnormal muscle tone.      Coordination: Coordination normal.      Deep Tendon Reflexes: Reflexes are normal and symmetric. Reflexes normal.   Psychiatric:         Mood and Affect: Mood normal.         Behavior: Behavior normal.         Thought Content: Thought content normal.         Judgment: Judgment normal.         Diagnoses and all orders for this visit:    1. Constipation, unspecified constipation type (Primary)    2. Adenomatous polyp of colon, unspecified part  of colon    3. Gastroesophageal reflux disease without esophagitis      Assessment:  1.  History of colon polyps.  Her last colonoscopy was in 4 of 2022.  I had recommended a repeat colonoscopy in 5 years.  2.  Constipation better on Linzess 290 mcg p.o. daily  3. GERD - on Pantoprazole 20 mg/day  4.     Recommendations:  1.  Repeat colonoscopy in 4 of 2027.  2.  Try stopping the Pantoprazole to see how she does.   3. F/u 1 yr.     Return in about 1 year (around 6/20/2023).    Akil Pedraza MD  6/20/2022

## 2022-07-12 ENCOUNTER — TRANSCRIBE ORDERS (OUTPATIENT)
Dept: ADMINISTRATIVE | Facility: HOSPITAL | Age: 72
End: 2022-07-12

## 2022-07-12 DIAGNOSIS — Z12.31 SCREENING MAMMOGRAM FOR BREAST CANCER: Primary | ICD-10-CM

## 2022-07-22 RX ORDER — PANTOPRAZOLE SODIUM 20 MG/1
TABLET, DELAYED RELEASE ORAL
Qty: 30 TABLET | Refills: 5 | Status: SHIPPED | OUTPATIENT
Start: 2022-07-22 | End: 2022-08-12 | Stop reason: SDUPTHER

## 2022-08-12 ENCOUNTER — TELEPHONE (OUTPATIENT)
Dept: GASTROENTEROLOGY | Facility: CLINIC | Age: 72
End: 2022-08-12

## 2022-08-12 RX ORDER — PANTOPRAZOLE SODIUM 20 MG/1
20 TABLET, DELAYED RELEASE ORAL DAILY
Qty: 90 TABLET | Refills: 1 | Status: SHIPPED | OUTPATIENT
Start: 2022-08-12

## 2022-08-12 NOTE — TELEPHONE ENCOUNTER
Faxed request received from [facility] to 90 day rx of pantoprazole 20  Mg daily.      See rx of 7/22/22 for same - #30, R5.      Escribe completed as above, #90, R1.

## 2023-03-01 ENCOUNTER — HOSPITAL ENCOUNTER (OUTPATIENT)
Dept: BONE DENSITY | Facility: HOSPITAL | Age: 73
Discharge: HOME | End: 2023-03-01
Payer: MEDICARE

## 2023-03-01 ENCOUNTER — HOSPITAL ENCOUNTER (OUTPATIENT)
Dept: MAMMOGRAPHY | Facility: HOSPITAL | Age: 73
Discharge: HOME | End: 2023-03-01
Payer: MEDICARE

## 2023-03-01 DIAGNOSIS — Z12.31 SCREENING MAMMOGRAM FOR BREAST CANCER: ICD-10-CM

## 2023-03-01 PROCEDURE — 77063 BREAST TOMOSYNTHESIS BI: CPT

## 2023-03-01 PROCEDURE — 77080 DXA BONE DENSITY AXIAL: CPT

## 2023-03-01 PROCEDURE — 77067 SCR MAMMO BI INCL CAD: CPT

## 2023-11-06 DIAGNOSIS — K21.00 GASTROESOPHAGEAL REFLUX DISEASE WITH ESOPHAGITIS WITHOUT HEMORRHAGE: Primary | ICD-10-CM

## 2023-11-06 RX ORDER — PANTOPRAZOLE SODIUM 20 MG/1
20 TABLET, DELAYED RELEASE ORAL DAILY
Qty: 90 TABLET | Refills: 3 | Status: SHIPPED | OUTPATIENT
Start: 2023-11-06

## 2024-07-26 ENCOUNTER — TRANSCRIBE ORDERS (OUTPATIENT)
Dept: ADMINISTRATIVE | Facility: HOSPITAL | Age: 74
End: 2024-07-26
Payer: MEDICARE

## 2024-07-26 DIAGNOSIS — Z13.6 SCREENING FOR HYPERTENSION: Primary | ICD-10-CM

## 2024-08-08 ENCOUNTER — TELEPHONE (OUTPATIENT)
Dept: GASTROENTEROLOGY | Facility: CLINIC | Age: 74
End: 2024-08-08
Payer: MEDICARE

## 2024-08-08 ENCOUNTER — OFFICE VISIT (OUTPATIENT)
Dept: GASTROENTEROLOGY | Facility: CLINIC | Age: 74
End: 2024-08-08
Payer: MEDICARE

## 2024-08-08 VITALS
BODY MASS INDEX: 18.57 KG/M2 | TEMPERATURE: 97.3 F | HEIGHT: 64 IN | DIASTOLIC BLOOD PRESSURE: 77 MMHG | SYSTOLIC BLOOD PRESSURE: 122 MMHG | HEART RATE: 87 BPM | WEIGHT: 108.8 LBS

## 2024-08-08 DIAGNOSIS — K21.00 GASTROESOPHAGEAL REFLUX DISEASE WITH ESOPHAGITIS WITHOUT HEMORRHAGE: Primary | ICD-10-CM

## 2024-08-08 DIAGNOSIS — K58.1 IRRITABLE BOWEL SYNDROME WITH CONSTIPATION: ICD-10-CM

## 2024-08-08 NOTE — TELEPHONE ENCOUNTER
----- Message from Jennifer Newsome sent at 8/8/2024 10:04 AM EDT -----  Please request most recent labs from PCP--looking for BMP or CMP

## 2024-08-08 NOTE — PROGRESS NOTES
"Chief Complaint  Abdominal Pain    Subjective          History of Present Illness    Lillina Harding is a  74 y.o. female presents for followup on constipation and GERD. She is a patient of Dr. Pedraza last seen on 7/20/2023.    Constipation previously treated with Linzess 290 mcg daily. She is no longer on this. Reports Bm every other day, manages with diet, fluids, high fiber.     GERD on Pantoprazole 20mg every morning. She was able to stop PPI from July to May of this year. But had recurrence in [place] pain radiating to back several months ago. She restarted pantoprazole and symptoms are improving. Still some burning in her left flank intermittently. No trigger.  Denies nausea, vomiting, melena, hematochezia, abnormal weight loss.    Lost  end of 2023 so stress has been high but does feel like it is improving.     Last colonoscopy in 4/2022.  Recall colonoscopy in 5 years. Personal history of colon polyps.    Objective   Vital Signs:   /77   Pulse 87   Temp 97.3 °F (36.3 °C) (Temporal)   Ht 161.5 cm (63.6\")   Wt 49.4 kg (108 lb 12.8 oz)   BMI 18.91 kg/m²       Physical Exam  Vitals reviewed.   Constitutional:       General: She is awake. She is not in acute distress.     Appearance: Normal appearance. She is well-developed and well-groomed.   HENT:      Head: Normocephalic.   Pulmonary:      Effort: Pulmonary effort is normal. No respiratory distress.   Abdominal:      General: Abdomen is flat. Bowel sounds are normal. There is no distension.      Palpations: Abdomen is soft. There is no hepatomegaly or mass.      Tenderness: There is no abdominal tenderness. There is no guarding or rebound.   Skin:     Coloration: Skin is not pale.   Neurological:      Mental Status: She is alert and oriented to person, place, and time.      Gait: Gait is intact.   Psychiatric:         Mood and Affect: Mood and affect normal.         Speech: Speech normal.         Behavior: Behavior is cooperative.         " Judgment: Judgment normal.          Result Review :             Assessment and Plan    Diagnoses and all orders for this visit:    1. Gastroesophageal reflux disease with esophagitis without hemorrhage (Primary)    2. Irritable bowel syndrome with constipation    Move pantoprazole dosing to later in the day. If persistent, she will let me know and will proceed with EGD for further evaluation. Pepcid as needed for flares.    Continue to manage constipation with diet, fluids, and activity.    Follow Up   Return in about 3 months (around 11/8/2024).    Dragon dictation used throughout this note.     Jennifer Newsome PA-C

## 2024-08-09 NOTE — TELEPHONE ENCOUNTER
7/2/2024 labs showed CBC unremarkable, hemoglobin 11.8; CMP unremarkable with creatinine 0.60, BUN 8.

## 2024-08-21 ENCOUNTER — HOSPITAL ENCOUNTER (OUTPATIENT)
Dept: CT IMAGING | Facility: HOSPITAL | Age: 74
Discharge: HOME | End: 2024-08-21
Admitting: INTERNAL MEDICINE

## 2024-08-21 DIAGNOSIS — Z13.6 SCREENING FOR HYPERTENSION: ICD-10-CM

## 2024-08-21 PROCEDURE — 75571 CT HRT W/O DYE W/CA TEST: CPT

## 2024-09-05 ENCOUNTER — PATIENT MESSAGE (OUTPATIENT)
Dept: GASTROENTEROLOGY | Facility: CLINIC | Age: 74
End: 2024-09-05
Payer: MEDICARE

## 2024-09-06 NOTE — TELEPHONE ENCOUNTER
From: Lillian Harding  To: Jennifer Newsome  Sent: 9/5/2024 5:24 PM EDT  Subject: Follow-up to 8/8/24 appointment    I am checking in as you requested and want to thank you for suggesting that I move my dosage of medication to later in the day. My stomach is still not exactly a happy camper, but the timing of the pantoprazole has helped.   I believe I will stay the course, for now. Also, should I plan on seeing you in a year? Thank you. Lillian Coleman

## 2024-10-04 ENCOUNTER — PATIENT MESSAGE (OUTPATIENT)
Dept: GASTROENTEROLOGY | Facility: CLINIC | Age: 74
End: 2024-10-04
Payer: MEDICARE

## 2024-10-04 DIAGNOSIS — R10.12 LEFT UPPER QUADRANT ABDOMINAL PAIN: Primary | ICD-10-CM

## 2024-10-07 NOTE — TELEPHONE ENCOUNTER
From: Lillian Harding  To: Jennifer Jerod  Sent: 10/4/2024 1:12 PM EDT  Subject: 10/4/24 - Requesting a scan    My gas and bloating are better now after two months of taking my medication later in the day, but I feel something still is not right. The slight pain and discomfort are difficult to predict – and still there. I know you had mentioned a scope…and then if the scope showed nothing, you would order a scan. I cannot tell you how much I appreciate the fact that you are trying to spare me additional radiation, but with my ’s death 9 months ago I have no one to rely on as a , so I would like to fast forward to a scan. I am hoping that will pinpoint the source of my problem. Thank you.

## 2024-11-06 ENCOUNTER — HOSPITAL ENCOUNTER (OUTPATIENT)
Dept: CT IMAGING | Facility: HOSPITAL | Age: 74
Discharge: HOME | End: 2024-11-06
Admitting: PHYSICIAN ASSISTANT
Payer: MEDICARE

## 2024-11-06 DIAGNOSIS — R10.12 LEFT UPPER QUADRANT ABDOMINAL PAIN: ICD-10-CM

## 2024-11-06 PROCEDURE — 74178 CT ABD&PLV WO CNTR FLWD CNTR: CPT

## 2024-11-06 PROCEDURE — 25510000001 IOPAMIDOL 61 % SOLUTION: Performed by: PHYSICIAN ASSISTANT

## 2024-11-06 RX ORDER — IOPAMIDOL 612 MG/ML
100 INJECTION, SOLUTION INTRAVASCULAR
Status: COMPLETED | OUTPATIENT
Start: 2024-11-06 | End: 2024-11-06

## 2024-11-06 RX ADMIN — IOPAMIDOL 85 ML: 612 INJECTION, SOLUTION INTRAVENOUS at 14:55

## 2024-11-26 DIAGNOSIS — K21.00 GASTROESOPHAGEAL REFLUX DISEASE WITH ESOPHAGITIS WITHOUT HEMORRHAGE: ICD-10-CM

## 2024-11-27 RX ORDER — PANTOPRAZOLE SODIUM 20 MG/1
20 TABLET, DELAYED RELEASE ORAL DAILY
Qty: 90 TABLET | Refills: 2 | Status: SHIPPED | OUTPATIENT
Start: 2024-11-27

## 2025-04-15 ENCOUNTER — TRANSCRIBE ORDERS (OUTPATIENT)
Dept: ADMINISTRATIVE | Facility: HOSPITAL | Age: 75
End: 2025-04-15
Payer: MEDICARE

## 2025-04-15 DIAGNOSIS — Z12.31 ENCOUNTER FOR SCREENING MAMMOGRAM FOR BREAST CANCER: Primary | ICD-10-CM

## 2025-05-01 ENCOUNTER — TELEPHONE (OUTPATIENT)
Dept: GASTROENTEROLOGY | Facility: CLINIC | Age: 75
End: 2025-05-01
Payer: MEDICARE

## 2025-05-01 NOTE — TELEPHONE ENCOUNTER
"Hub staff attempted to follow warm transfer process and was unsuccessful     Caller: Lillian Harding \"Abigail\"    Relationship to patient: Self    Best call back number: [phone]    Patient is needing: PT IS CALLING TO SEE IF SHE CAN GET SCHEDULED FOR HER YEARLY WITH BANDAR HERNANDEZ. PT LAST APPT WAS 08/08/2025. HUB CAN NOT SCHEDULED PAST 08/02/2025. PLEASE GIVE PT A CALL BACK AND IF NOT ABLE TO REACH PT. IT IS OKAY TO LVM. IF OFFICE CAN NOT SCHEDULE PT PLEASE PUT A REMINDER TO REACH OUT TO PT ONCE IT IS OKAY TO SCHEDULE PASS 08/02/2025.        "

## 2025-05-01 NOTE — TELEPHONE ENCOUNTER
Ok for hub to relay.  Called and spoke with the pt and informed her that we cannot schedule in August yet.  I will defer her message and will call when the schedule opens up.

## 2025-05-14 ENCOUNTER — TELEPHONE (OUTPATIENT)
Dept: GASTROENTEROLOGY | Facility: CLINIC | Age: 75
End: 2025-05-14
Payer: MEDICARE

## 2025-05-14 NOTE — TELEPHONE ENCOUNTER
Tried calling pt to schedule yearly with yonatan but when the call was picked up, I couldn't hear anything on the other line. Will try to call again tomorrow 5/15.

## 2025-05-15 ENCOUNTER — TELEPHONE (OUTPATIENT)
Dept: GASTROENTEROLOGY | Facility: CLINIC | Age: 75
End: 2025-05-15
Payer: MEDICARE

## 2025-05-15 NOTE — TELEPHONE ENCOUNTER
Tried calling pt again, phone picks up but can't hear anything so I called daughter. Left vm about needing to call and schedule her annual with yonatan.

## 2025-05-30 ENCOUNTER — HOSPITAL ENCOUNTER (OUTPATIENT)
Dept: MAMMOGRAPHY | Facility: HOSPITAL | Age: 75
Discharge: HOME | End: 2025-05-30
Admitting: INTERNAL MEDICINE
Payer: MEDICARE

## 2025-05-30 DIAGNOSIS — Z12.31 ENCOUNTER FOR SCREENING MAMMOGRAM FOR BREAST CANCER: ICD-10-CM

## 2025-05-30 PROCEDURE — 77063 BREAST TOMOSYNTHESIS BI: CPT

## 2025-05-30 PROCEDURE — 77067 SCR MAMMO BI INCL CAD: CPT

## 2025-06-05 ENCOUNTER — TRANSCRIBE ORDERS (OUTPATIENT)
Dept: ADMINISTRATIVE | Facility: HOSPITAL | Age: 75
End: 2025-06-05
Payer: MEDICARE

## 2025-06-05 DIAGNOSIS — R92.8 ABNORMAL MAMMOGRAM: Primary | ICD-10-CM

## 2025-07-18 ENCOUNTER — APPOINTMENT (OUTPATIENT)
Dept: ULTRASOUND IMAGING | Facility: HOSPITAL | Age: 75
End: 2025-07-18
Payer: MEDICARE

## 2025-07-18 ENCOUNTER — HOSPITAL ENCOUNTER (OUTPATIENT)
Dept: MAMMOGRAPHY | Facility: HOSPITAL | Age: 75
Discharge: HOME | End: 2025-07-18
Payer: MEDICARE

## 2025-07-18 DIAGNOSIS — R92.8 ABNORMAL MAMMOGRAM: ICD-10-CM

## 2025-07-18 PROCEDURE — 77065 DX MAMMO INCL CAD UNI: CPT

## 2025-07-18 PROCEDURE — G0279 TOMOSYNTHESIS, MAMMO: HCPCS

## 2025-07-24 ENCOUNTER — TRANSCRIBE ORDERS (OUTPATIENT)
Dept: ADMINISTRATIVE | Facility: HOSPITAL | Age: 75
End: 2025-07-24
Payer: MEDICARE

## 2025-07-24 DIAGNOSIS — Z78.0 MENOPAUSE: Primary | ICD-10-CM

## (undated) DEVICE — KT ORCA ORCAPOD DISP STRL

## (undated) DEVICE — Device: Brand: DEFENDO AIR/WATER/SUCTION AND BIOPSY VALVE

## (undated) DEVICE — CANNULA,ADULT,SOFT-TOUCH,7'TUBE,UC: Brand: PENDING

## (undated) DEVICE — SINGLE-USE BIOPSY FORCEPS: Brand: RADIAL JAW 4

## (undated) DEVICE — LN SMPL CO2 SHTRM SD STREAM W/M LUER

## (undated) DEVICE — TUBING, SUCTION, 1/4" X 10', STRAIGHT: Brand: MEDLINE

## (undated) DEVICE — SENSR O2 OXIMAX FNGR A/ 18IN NONSTR

## (undated) DEVICE — BITEBLOCK OMNI BLOC

## (undated) DEVICE — FRCP BX RADJAW4 NDL 2.8 240CM LG OG BX40

## (undated) DEVICE — THE TORRENT IRRIGATION SCOPE CONNECTOR IS USED WITH THE TORRENT IRRIGATION TUBING TO PROVIDE IRRIGATION FLUIDS SUCH AS STERILE WATER DURING GASTROINTESTINAL ENDOSCOPIC PROCEDURES WHEN USED IN CONJUNCTION WITH AN IRRIGATION PUMP (OR ELECTROSURGICAL UNIT).: Brand: TORRENT

## (undated) DEVICE — ADAPT CLN BIOGUARD AIR/H2O DISP

## (undated) DEVICE — CANN O2 ETCO2 FITS ALL CONN CO2 SMPL A/ 7IN DISP LF